# Patient Record
Sex: FEMALE | Race: WHITE | NOT HISPANIC OR LATINO | Employment: OTHER | URBAN - NONMETROPOLITAN AREA
[De-identification: names, ages, dates, MRNs, and addresses within clinical notes are randomized per-mention and may not be internally consistent; named-entity substitution may affect disease eponyms.]

---

## 2017-01-17 DIAGNOSIS — M54.17 LUMBOSACRAL NEURITIS: ICD-10-CM

## 2017-01-17 DIAGNOSIS — M25.50 MULTIPLE JOINT PAIN: ICD-10-CM

## 2017-01-17 DIAGNOSIS — F41.9 ANXIETY: ICD-10-CM

## 2017-01-17 RX ORDER — HYDROCODONE BITARTRATE AND ACETAMINOPHEN 10; 325 MG/1; MG/1
TABLET ORAL
Qty: 120 TABLET | Refills: 0 | Status: SHIPPED | OUTPATIENT
Start: 2017-01-17 | End: 2017-02-17 | Stop reason: SDUPTHER

## 2017-01-17 RX ORDER — CLONAZEPAM 1 MG/1
TABLET ORAL
Qty: 90 TABLET | Refills: 0 | Status: SHIPPED | OUTPATIENT
Start: 2017-01-17 | End: 2017-02-17 | Stop reason: SDUPTHER

## 2017-02-17 ENCOUNTER — OFFICE VISIT (OUTPATIENT)
Dept: FAMILY MEDICINE CLINIC | Facility: CLINIC | Age: 52
End: 2017-02-17

## 2017-02-17 VITALS
BODY MASS INDEX: 36.3 KG/M2 | OXYGEN SATURATION: 97 % | TEMPERATURE: 97.1 F | SYSTOLIC BLOOD PRESSURE: 118 MMHG | WEIGHT: 268 LBS | DIASTOLIC BLOOD PRESSURE: 86 MMHG | HEIGHT: 72 IN | HEART RATE: 92 BPM

## 2017-02-17 DIAGNOSIS — J40 BRONCHITIS: Primary | ICD-10-CM

## 2017-02-17 DIAGNOSIS — M54.17 LUMBOSACRAL NEURITIS: ICD-10-CM

## 2017-02-17 DIAGNOSIS — M25.50 MULTIPLE JOINT PAIN: ICD-10-CM

## 2017-02-17 DIAGNOSIS — G47.00 INSOMNIA, UNSPECIFIED TYPE: ICD-10-CM

## 2017-02-17 DIAGNOSIS — F41.9 ANXIETY: ICD-10-CM

## 2017-02-17 PROCEDURE — 99213 OFFICE O/P EST LOW 20 MIN: CPT | Performed by: NURSE PRACTITIONER

## 2017-02-17 RX ORDER — CLONAZEPAM 1 MG/1
TABLET ORAL
Qty: 90 TABLET | Refills: 0 | Status: SHIPPED | OUTPATIENT
Start: 2017-02-17 | End: 2017-04-04 | Stop reason: SDUPTHER

## 2017-02-17 RX ORDER — ZOLPIDEM TARTRATE 10 MG/1
10 TABLET ORAL NIGHTLY PRN
Qty: 30 TABLET | Refills: 2 | Status: SHIPPED | OUTPATIENT
Start: 2017-02-17 | End: 2017-03-23 | Stop reason: SDUPTHER

## 2017-02-17 RX ORDER — BENZONATATE 100 MG/1
100 CAPSULE ORAL 3 TIMES DAILY PRN
Qty: 21 CAPSULE | Refills: 0 | Status: SHIPPED | OUTPATIENT
Start: 2017-02-17 | End: 2017-07-31

## 2017-02-17 RX ORDER — ALBUTEROL SULFATE 2.5 MG/3ML
2.5 SOLUTION RESPIRATORY (INHALATION) EVERY 4 HOURS PRN
Qty: 25 VIAL | Refills: 3 | Status: SHIPPED | OUTPATIENT
Start: 2017-02-17 | End: 2017-07-13 | Stop reason: SDUPTHER

## 2017-02-17 RX ORDER — HYDROCODONE BITARTRATE AND ACETAMINOPHEN 10; 325 MG/1; MG/1
TABLET ORAL
Qty: 120 TABLET | Refills: 0 | Status: SHIPPED | OUTPATIENT
Start: 2017-02-17 | End: 2017-04-25 | Stop reason: SDUPTHER

## 2017-02-17 NOTE — PROGRESS NOTES
Subjective   Shania Sweeney is a 51 y.o. female who presents to the office for follow-up of back pain, insomnia and anxiety.  Will need refills of hydrocodone, Ambien and Klonopin for these conditions respectively.  Lipase refills for all 3 of these medications was on January 17.  Is also experiencing some cough, was seen at [facility] with the past week who gave her a steroid pack, Z-Matt and changed antihistamine from Claritin to Zyrtec.  States she does feel somewhat better but continues with a cough.  States she has a nebulizer machine at home.  PCP is Leonardo.    History of Present Illness     The following portions of the patient's history were reviewed and updated as appropriate: allergies, current medications, past family history, past medical history, past social history, past surgical history and problem list.    Review of Systems   Constitutional: Negative for chills, fatigue and fever.   HENT: Negative for congestion, sneezing, sore throat and trouble swallowing.    Eyes: Negative for visual disturbance.   Respiratory: Positive for cough and wheezing. Negative for chest tightness and shortness of breath.    Cardiovascular: Negative for chest pain, palpitations and leg swelling.   Gastrointestinal: Negative for abdominal pain, constipation, diarrhea, nausea and vomiting.   Genitourinary: Negative for dysuria, frequency and urgency.   Musculoskeletal: Negative for neck pain.   Skin: Negative for rash.   Neurological: Negative for dizziness, weakness and headaches.   Psychiatric/Behavioral:        In the past two weeks the pt has not felt down, depressed, hopeless or lost interest in doing things   All other systems reviewed and are negative.    Objective   Physical Exam   Constitutional: She is oriented to person, place, and time. She appears well-developed and well-nourished.   HENT:   Head: Normocephalic and atraumatic.   Right Ear: External ear normal.   Left Ear: External ear  normal.   Nose: Nose normal.   Mouth/Throat: Oropharynx is clear and moist.   Eyes: Conjunctivae and EOM are normal. Pupils are equal, round, and reactive to light. Right eye exhibits no discharge. Left eye exhibits no discharge. No scleral icterus.   Neck: Normal range of motion. Neck supple. No thyromegaly present.   Cardiovascular: Normal rate, regular rhythm, normal heart sounds and intact distal pulses.  Exam reveals no gallop and no friction rub.    No murmur heard.  Pulmonary/Chest: Effort normal. No respiratory distress. She has no wheezes. She has rhonchi (posterior) in the right upper field, the right middle field and the right lower field. She has no rales.   Abdominal: Soft. Bowel sounds are normal. She exhibits no distension. There is no tenderness. There is no rebound and no guarding.   Musculoskeletal: Normal range of motion. She exhibits no edema.        Lumbar back: She exhibits pain (chronic, rates pain a 6).   Lymphadenopathy:     She has no cervical adenopathy.   Neurological: She is alert and oriented to person, place, and time. No cranial nerve deficit. GCS eye subscore is 4. GCS verbal subscore is 5. GCS motor subscore is 6.   Skin: Skin is warm, dry and intact. No rash noted.   Psychiatric: She has a normal mood and affect. Her speech is normal and behavior is normal. Judgment and thought content normal. Her mood appears not anxious. Cognition and memory are normal. She does not exhibit a depressed mood. She expresses no homicidal and no suicidal ideation. She expresses no suicidal plans and no homicidal plans.   Dressed appropriately for weather and situation, makes eye contact and engages in conversation.  No outward signs of anxiety or depression   Nursing note and vitals reviewed.    Assessment/Plan   Shania was seen today for back pain, insomnia and anxiety.    Diagnoses and all orders for this visit:    Anxiety  -     clonazePAM (KlonoPIN) 1 MG tablet; Take one in the AM and 2 in the  evening prn anxiety.    Multiple joint pain  -     HYDROcodone-acetaminophen (NORCO)  MG per tablet; Take 1/2 to 1 tablet by mouth every 6 hours as needed for pain    Lumbosacral neuritis  -     HYDROcodone-acetaminophen (NORCO)  MG per tablet; Take 1/2 to 1 tablet by mouth every 6 hours as needed for pain    Insomnia, unspecified type  -     zolpidem (AMBIEN) 10 MG tablet; Take 1 tablet by mouth At Night As Needed for sleep.         Patient understands the risks associated with this controlled medication, including tolerance and addiction.  Patient also agrees to only obtain this medication from me, and not from a another provider, unless that provider is covering for me in my absence.  Patient also agrees to be compliant in dosing, and not self adjust the dose of medication.  A signed controlled substance agreement is on file, and the patient has received a controlled substance education sheet at this a previous visit.  The patient has also signed a consent for treatment with a controlled substance as per [facility] policy. JAYRO was obtained.

## 2017-02-17 NOTE — PATIENT INSTRUCTIONS
Encouraged meds as directed.  Should take one nebulizer treatment every 6 hours.  Good handwashing, no smoke exposure and fluids.

## 2017-03-06 RX ORDER — BACLOFEN 20 MG/1
TABLET ORAL
Qty: 120 TABLET | Refills: 0 | Status: SHIPPED | OUTPATIENT
Start: 2017-03-06 | End: 2017-07-26 | Stop reason: SDUPTHER

## 2017-03-14 RX ORDER — CETIRIZINE HYDROCHLORIDE 10 MG/1
10 TABLET ORAL DAILY
Qty: 30 TABLET | Refills: 2 | Status: SHIPPED | OUTPATIENT
Start: 2017-03-14 | End: 2017-04-06 | Stop reason: SDUPTHER

## 2017-03-23 DIAGNOSIS — G47.00 INSOMNIA, UNSPECIFIED TYPE: ICD-10-CM

## 2017-03-23 RX ORDER — ZOLPIDEM TARTRATE 10 MG/1
10 TABLET ORAL NIGHTLY PRN
Qty: 30 TABLET | Refills: 2 | Status: SHIPPED | OUTPATIENT
Start: 2017-03-23 | End: 2017-04-06 | Stop reason: SDUPTHER

## 2017-04-04 DIAGNOSIS — F41.9 ANXIETY: ICD-10-CM

## 2017-04-04 RX ORDER — CLONAZEPAM 1 MG/1
TABLET ORAL
Qty: 90 TABLET | Refills: 0 | Status: SHIPPED | OUTPATIENT
Start: 2017-04-04 | End: 2017-04-06 | Stop reason: SDUPTHER

## 2017-04-06 ENCOUNTER — OFFICE VISIT (OUTPATIENT)
Dept: FAMILY MEDICINE CLINIC | Facility: CLINIC | Age: 52
End: 2017-04-06

## 2017-04-06 VITALS
DIASTOLIC BLOOD PRESSURE: 78 MMHG | SYSTOLIC BLOOD PRESSURE: 104 MMHG | HEART RATE: 66 BPM | TEMPERATURE: 98.2 F | BODY MASS INDEX: 36.3 KG/M2 | WEIGHT: 268 LBS | OXYGEN SATURATION: 98 % | HEIGHT: 72 IN

## 2017-04-06 DIAGNOSIS — G47.00 INSOMNIA, UNSPECIFIED TYPE: ICD-10-CM

## 2017-04-06 DIAGNOSIS — R32 URINARY INCONTINENCE, UNSPECIFIED TYPE: Primary | ICD-10-CM

## 2017-04-06 DIAGNOSIS — J40 BRONCHITIS: ICD-10-CM

## 2017-04-06 DIAGNOSIS — M25.50 MULTIPLE JOINT PAIN: ICD-10-CM

## 2017-04-06 DIAGNOSIS — F41.9 ANXIETY: ICD-10-CM

## 2017-04-06 DIAGNOSIS — E66.9 OBESITY, UNSPECIFIED OBESITY SEVERITY, UNSPECIFIED OBESITY TYPE: ICD-10-CM

## 2017-04-06 PROCEDURE — 99214 OFFICE O/P EST MOD 30 MIN: CPT | Performed by: NURSE PRACTITIONER

## 2017-04-06 RX ORDER — GABAPENTIN 800 MG/1
800 TABLET ORAL 3 TIMES DAILY
Qty: 270 TABLET | Refills: 1 | Status: SHIPPED | OUTPATIENT
Start: 2017-04-06 | End: 2017-04-14 | Stop reason: SDUPTHER

## 2017-04-06 RX ORDER — AMITRIPTYLINE HYDROCHLORIDE 25 MG/1
25 TABLET, FILM COATED ORAL NIGHTLY
Qty: 30 TABLET | Refills: 1 | Status: SHIPPED | OUTPATIENT
Start: 2017-04-06 | End: 2017-04-14 | Stop reason: SDUPTHER

## 2017-04-06 RX ORDER — OXYBUTYNIN CHLORIDE 10 MG/1
10 TABLET, EXTENDED RELEASE ORAL DAILY
Qty: 30 TABLET | Refills: 1 | Status: SHIPPED | OUTPATIENT
Start: 2017-04-06 | End: 2017-04-14 | Stop reason: SDUPTHER

## 2017-04-06 RX ORDER — LIDOCAINE 50 MG/G
1 PATCH TOPICAL EVERY 24 HOURS
COMMUNITY
End: 2017-07-31

## 2017-04-06 RX ORDER — ZOLPIDEM TARTRATE 10 MG/1
10 TABLET ORAL NIGHTLY PRN
Qty: 30 TABLET | Refills: 2 | Status: SHIPPED | OUTPATIENT
Start: 2017-04-06 | End: 2017-06-19 | Stop reason: SDUPTHER

## 2017-04-06 RX ORDER — ESCITALOPRAM OXALATE 20 MG/1
20 TABLET ORAL DAILY
Qty: 90 TABLET | Refills: 3 | Status: SHIPPED | OUTPATIENT
Start: 2017-04-06 | End: 2017-04-14 | Stop reason: SDUPTHER

## 2017-04-06 RX ORDER — MONTELUKAST SODIUM 10 MG/1
TABLET ORAL
Qty: 90 TABLET | Refills: 1 | Status: SHIPPED | OUTPATIENT
Start: 2017-04-06 | End: 2017-04-14 | Stop reason: SDUPTHER

## 2017-04-06 RX ORDER — CETIRIZINE HYDROCHLORIDE 10 MG/1
10 TABLET ORAL DAILY
Qty: 90 TABLET | Refills: 3 | Status: SHIPPED | OUTPATIENT
Start: 2017-04-06 | End: 2017-08-07 | Stop reason: SDUPTHER

## 2017-04-06 RX ORDER — CLONAZEPAM 1 MG/1
TABLET ORAL
Qty: 90 TABLET | Refills: 0 | Status: SHIPPED | OUTPATIENT
Start: 2017-04-06 | End: 2017-05-05 | Stop reason: SDUPTHER

## 2017-04-06 RX ORDER — PHENTERMINE HYDROCHLORIDE 37.5 MG/1
CAPSULE ORAL
Qty: 15 CAPSULE | Refills: 1 | Status: CANCELLED | OUTPATIENT
Start: 2017-04-06
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
## 2017-04-14 DIAGNOSIS — R32 URINARY INCONTINENCE, UNSPECIFIED TYPE: ICD-10-CM

## 2017-04-14 DIAGNOSIS — M25.50 MULTIPLE JOINT PAIN: ICD-10-CM

## 2017-04-14 DIAGNOSIS — J40 BRONCHITIS: ICD-10-CM

## 2017-04-14 DIAGNOSIS — F41.9 ANXIETY: ICD-10-CM

## 2017-04-14 RX ORDER — MONTELUKAST SODIUM 10 MG/1
TABLET ORAL
Qty: 90 TABLET | Refills: 1 | Status: SHIPPED | OUTPATIENT
Start: 2017-04-14 | End: 2017-10-26 | Stop reason: SDUPTHER

## 2017-04-14 RX ORDER — OXYBUTYNIN CHLORIDE 10 MG/1
10 TABLET, EXTENDED RELEASE ORAL DAILY
Qty: 90 TABLET | Refills: 1 | Status: SHIPPED | OUTPATIENT
Start: 2017-04-14 | End: 2017-10-26 | Stop reason: SDUPTHER

## 2017-04-14 RX ORDER — GABAPENTIN 800 MG/1
800 TABLET ORAL 3 TIMES DAILY
Qty: 270 TABLET | Refills: 1 | Status: SHIPPED | OUTPATIENT
Start: 2017-04-14 | End: 2017-08-07

## 2017-04-14 RX ORDER — AMITRIPTYLINE HYDROCHLORIDE 25 MG/1
25 TABLET, FILM COATED ORAL NIGHTLY
Qty: 90 TABLET | Refills: 1 | Status: SHIPPED | OUTPATIENT
Start: 2017-04-14 | End: 2017-04-14 | Stop reason: SDUPTHER

## 2017-04-14 RX ORDER — ESCITALOPRAM OXALATE 20 MG/1
20 TABLET ORAL DAILY
Qty: 90 TABLET | Refills: 3 | Status: SHIPPED | OUTPATIENT
Start: 2017-04-14 | End: 2017-10-25 | Stop reason: ALTCHOICE

## 2017-04-14 RX ORDER — AMITRIPTYLINE HYDROCHLORIDE 25 MG/1
25 TABLET, FILM COATED ORAL NIGHTLY
Qty: 90 TABLET | Refills: 1 | Status: SHIPPED | OUTPATIENT
Start: 2017-04-14 | End: 2017-04-25

## 2017-04-14 RX ORDER — OXYBUTYNIN CHLORIDE 10 MG/1
10 TABLET, EXTENDED RELEASE ORAL DAILY
Qty: 90 TABLET | Refills: 1 | Status: SHIPPED | OUTPATIENT
Start: 2017-04-14 | End: 2017-04-14 | Stop reason: SDUPTHER

## 2017-04-14 NOTE — TELEPHONE ENCOUNTER
Patient called stating she needed medication sent to [facility] instead of [facility]. Medication that was sent on 04/06/2017 to [facility] was reordered and sent to [facility]

## 2017-04-25 ENCOUNTER — OFFICE VISIT (OUTPATIENT)
Dept: FAMILY MEDICINE CLINIC | Facility: CLINIC | Age: 52
End: 2017-04-25

## 2017-04-25 VITALS — DIASTOLIC BLOOD PRESSURE: 64 MMHG | HEART RATE: 77 BPM | HEIGHT: 72 IN | SYSTOLIC BLOOD PRESSURE: 118 MMHG

## 2017-04-25 DIAGNOSIS — F32.9 REACTIVE DEPRESSION: ICD-10-CM

## 2017-04-25 DIAGNOSIS — M54.17 LUMBOSACRAL NEURITIS: ICD-10-CM

## 2017-04-25 DIAGNOSIS — R32 URINARY INCONTINENCE, UNSPECIFIED TYPE: Primary | ICD-10-CM

## 2017-04-25 DIAGNOSIS — M25.50 MULTIPLE JOINT PAIN: ICD-10-CM

## 2017-04-25 PROCEDURE — 99214 OFFICE O/P EST MOD 30 MIN: CPT | Performed by: NURSE PRACTITIONER

## 2017-04-25 RX ORDER — AMITRIPTYLINE HYDROCHLORIDE 50 MG/1
50 TABLET, FILM COATED ORAL NIGHTLY
Qty: 30 TABLET | Refills: 5 | Status: SHIPPED | OUTPATIENT
Start: 2017-04-25 | End: 2017-07-13

## 2017-04-25 RX ORDER — HYDROCODONE BITARTRATE AND ACETAMINOPHEN 10; 325 MG/1; MG/1
TABLET ORAL
Qty: 120 TABLET | Refills: 0 | Status: SHIPPED | OUTPATIENT
Start: 2017-04-25 | End: 2017-04-25 | Stop reason: SDUPTHER

## 2017-04-25 RX ORDER — HYDROCODONE BITARTRATE AND ACETAMINOPHEN 10; 325 MG/1; MG/1
TABLET ORAL
Qty: 120 TABLET | Refills: 0 | Status: SHIPPED | OUTPATIENT
Start: 2017-04-25 | End: 2017-07-13 | Stop reason: SDUPTHER

## 2017-04-25 NOTE — PROGRESS NOTES
Subjective   Shania Sweeney is a 52 y.o. female.     Chief Complaint   Patient presents with   • Urinary Incontinence     at night only since January when she fell       Anxiety   Symptoms include dizziness, insomnia and nervous/anxious behavior. Patient reports no chest pain, confusion, nausea, palpitations, shortness of breath or suicidal ideas.       Insomnia   Associated symptoms include arthralgias, coughing, headaches, neck pain and numbness. Pertinent negatives include no abdominal pain, chest pain, chills, congestion, fatigue, fever, myalgias, nausea, rash, sore throat, vomiting or weakness.        Here to f/u on chronic neck and low back pain.  Her urinary incontinence has not improved with Ditropan and Elavil. This started after her [place] the mall around 4-5 months ago. She states she has to recline back a little when sitting up on the toilet in order to empty her bladder.      She does have a new disk herniation in her lumbar spine. Dr. Cannon has ordered ESIS and doesn't currently recommend surgery. She will f/u with him after her 3rd injection.      She is having depression since her setback. She is unable to be as active and is gaining weight.         The following portions of the patient's history were reviewed and updated as appropriate: allergies, past family history, past medical history, past social history, past surgical history and problem list.    Review of Systems   Constitutional: Negative for activity change, chills, fatigue and fever.   HENT: Negative for congestion, rhinorrhea, sinus pressure and sore throat.    Eyes: Positive for visual disturbance. Negative for pain, discharge and itching.   Respiratory: Positive for cough. Negative for shortness of breath and wheezing.    Cardiovascular: Negative for chest pain, palpitations and leg swelling.   Gastrointestinal: Negative for abdominal pain, blood in stool, constipation, diarrhea, nausea and vomiting.   Endocrine: Negative for  "polydipsia and polyuria.   Genitourinary: Negative for dysuria, flank pain, frequency, hematuria and urgency.   Musculoskeletal: Positive for arthralgias, back pain, neck pain and neck stiffness. Negative for gait problem and myalgias.   Skin: Negative for rash.   Neurological: Positive for dizziness, speech difficulty, numbness and headaches. Negative for tremors, seizures, syncope and weakness.        Burning pain in both feet  \"firecraker\" pain in the right hand.   Hematological: Negative for adenopathy. Does not bruise/bleed easily.   Psychiatric/Behavioral: Positive for sleep disturbance. Negative for confusion, dysphoric mood and suicidal ideas. The patient is nervous/anxious and has insomnia.        Objective   Physical Exam   Constitutional: She is oriented to person, place, and time. She appears well-developed and well-nourished. No distress.   Eyes: Conjunctivae and EOM are normal. Pupils are equal, round, and reactive to light. Right eye exhibits no discharge. Left eye exhibits no discharge. No scleral icterus.   Neck: Neck supple. No thyromegaly present.   Cardiovascular: Normal rate, regular rhythm and normal heart sounds.    No murmur heard.  Pulmonary/Chest: Effort normal and breath sounds normal. No respiratory distress. She has no wheezes. She has no rales.   Musculoskeletal: She exhibits tenderness. She exhibits no edema or deformity.   Cervical pv muscle tenderness       Lymphadenopathy:     She has no cervical adenopathy.   Neurological: She is alert and oriented to person, place, and time. No cranial nerve deficit. Coordination normal.   Slow speech with some scanning noted at times.   Impaired heel/toe walking  Strength to extremities intact.  Light touch and 2 point discrimination to right hand and feet impaired.      Skin: Skin is warm and dry. No rash noted. No pallor.   Right great toenail with the distal section missing. The proximal section is intact but dark in color.    Psychiatric: She " has a normal mood and affect. Her behavior is normal.   Nursing note and vitals reviewed.      Assessment/Plan   Shania was seen today for urinary incontinence.    Diagnoses and all orders for this visit:    Urinary incontinence, unspecified type  -     amitriptyline (ELAVIL) 50 MG tablet; Take 1 tablet by mouth Every Night.  -     tamsulosin (FLOMAX) 0.4 MG capsule 24 hr capsule; Take 1 capsule by mouth Every Night.    Multiple joint pain  -     Discontinue: HYDROcodone-acetaminophen (NORCO)  MG per tablet; Take 1/2 to 1 tablet by mouth every 6 hours as needed for pain  -     amitriptyline (ELAVIL) 50 MG tablet; Take 1 tablet by mouth Every Night.  -     HYDROcodone-acetaminophen (NORCO)  MG per tablet; Take 1/2 to 1 tablet by mouth every 6 hours as needed for pain    Lumbosacral neuritis  -     Discontinue: HYDROcodone-acetaminophen (NORCO)  MG per tablet; Take 1/2 to 1 tablet by mouth every 6 hours as needed for pain  -     amitriptyline (ELAVIL) 50 MG tablet; Take 1 tablet by mouth Every Night.  -     HYDROcodone-acetaminophen (NORCO)  MG per tablet; Take 1/2 to 1 tablet by mouth every 6 hours as needed for pain    Reactive depression    Stop Ditropan. She may be having overflow incontinence due to retention. Will try her on Flomax and monitor for rash due to possible cross sensitivity due to sulfa rash.   Refer to Urology. Will continue Elavil due to the pain benefit.     She will f/u with Dr. Cannon after her 3 epidurals.     Continue other medications. Reviewed potential medication side effects and benefits. Instructed to report side effects. Report if symptoms worsen or persist. Understanding expressed.    Counseling for depression encouraged and she will consider.     F/U  With me in 3 mos and prn.

## 2017-04-29 RX ORDER — TAMSULOSIN HYDROCHLORIDE 0.4 MG/1
1 CAPSULE ORAL NIGHTLY
Qty: 30 CAPSULE | Refills: 5 | Status: SHIPPED | OUTPATIENT
Start: 2017-04-29 | End: 2017-07-31 | Stop reason: ALTCHOICE

## 2017-05-01 DIAGNOSIS — R32 URINARY INCONTINENCE, UNSPECIFIED TYPE: Primary | ICD-10-CM

## 2017-05-02 ENCOUNTER — TRANSCRIBE ORDERS (OUTPATIENT)
Dept: PHYSICAL THERAPY | Facility: HOSPITAL | Age: 52
End: 2017-05-02

## 2017-05-02 DIAGNOSIS — S92.355D CLOSED NONDISPLACED FRACTURE OF FIFTH METATARSAL BONE OF LEFT FOOT WITH ROUTINE HEALING, SUBSEQUENT ENCOUNTER: Primary | ICD-10-CM

## 2017-05-05 DIAGNOSIS — F41.9 ANXIETY: ICD-10-CM

## 2017-05-09 RX ORDER — CLONAZEPAM 1 MG/1
TABLET ORAL
Qty: 90 TABLET | Refills: 0 | Status: SHIPPED | OUTPATIENT
Start: 2017-05-09 | End: 2017-06-13 | Stop reason: SDUPTHER

## 2017-05-11 ENCOUNTER — HOSPITAL ENCOUNTER (OUTPATIENT)
Dept: PHYSICAL THERAPY | Facility: HOSPITAL | Age: 52
Setting detail: THERAPIES SERIES
Discharge: HOME | End: 2017-05-11

## 2017-05-11 DIAGNOSIS — S92.355D CLOSED NONDISPLACED FRACTURE OF FIFTH METATARSAL BONE OF LEFT FOOT WITH ROUTINE HEALING, SUBSEQUENT ENCOUNTER: Primary | ICD-10-CM

## 2017-05-11 PROCEDURE — 97161 PT EVAL LOW COMPLEX 20 MIN: CPT

## 2017-05-11 PROCEDURE — G0283 ELEC STIM OTHER THAN WOUND: HCPCS

## 2017-05-11 PROCEDURE — 97110 THERAPEUTIC EXERCISES: CPT

## 2017-05-15 ENCOUNTER — HOSPITAL ENCOUNTER (OUTPATIENT)
Dept: PHYSICAL THERAPY | Facility: HOSPITAL | Age: 52
Setting detail: THERAPIES SERIES
Discharge: HOME | End: 2017-05-15

## 2017-05-15 DIAGNOSIS — S92.355D CLOSED NONDISPLACED FRACTURE OF FIFTH METATARSAL BONE OF LEFT FOOT WITH ROUTINE HEALING, SUBSEQUENT ENCOUNTER: Primary | ICD-10-CM

## 2017-05-15 PROCEDURE — 97140 MANUAL THERAPY 1/> REGIONS: CPT

## 2017-05-15 PROCEDURE — G0283 ELEC STIM OTHER THAN WOUND: HCPCS

## 2017-05-15 PROCEDURE — 97110 THERAPEUTIC EXERCISES: CPT

## 2017-05-16 ENCOUNTER — HOSPITAL ENCOUNTER (OUTPATIENT)
Dept: PHYSICAL THERAPY | Facility: HOSPITAL | Age: 52
Setting detail: THERAPIES SERIES
Discharge: HOME | End: 2017-05-16

## 2017-05-16 DIAGNOSIS — S92.355D CLOSED NONDISPLACED FRACTURE OF FIFTH METATARSAL BONE OF LEFT FOOT WITH ROUTINE HEALING, SUBSEQUENT ENCOUNTER: Primary | ICD-10-CM

## 2017-05-16 PROCEDURE — 97140 MANUAL THERAPY 1/> REGIONS: CPT

## 2017-05-16 PROCEDURE — 97110 THERAPEUTIC EXERCISES: CPT

## 2017-05-18 ENCOUNTER — HOSPITAL ENCOUNTER (OUTPATIENT)
Dept: PHYSICAL THERAPY | Facility: HOSPITAL | Age: 52
Setting detail: THERAPIES SERIES
Discharge: HOME | End: 2017-05-18

## 2017-05-18 DIAGNOSIS — S92.355D CLOSED NONDISPLACED FRACTURE OF FIFTH METATARSAL BONE OF LEFT FOOT WITH ROUTINE HEALING, SUBSEQUENT ENCOUNTER: Primary | ICD-10-CM

## 2017-05-18 PROCEDURE — 97140 MANUAL THERAPY 1/> REGIONS: CPT

## 2017-05-18 PROCEDURE — 97110 THERAPEUTIC EXERCISES: CPT

## 2017-05-23 ENCOUNTER — HOSPITAL ENCOUNTER (OUTPATIENT)
Dept: PHYSICAL THERAPY | Facility: HOSPITAL | Age: 52
Setting detail: THERAPIES SERIES
End: 2017-05-23

## 2017-05-25 ENCOUNTER — HOSPITAL ENCOUNTER (OUTPATIENT)
Dept: PHYSICAL THERAPY | Facility: HOSPITAL | Age: 52
Setting detail: THERAPIES SERIES
Discharge: HOME | End: 2017-05-25

## 2017-05-25 DIAGNOSIS — S92.355D CLOSED NONDISPLACED FRACTURE OF FIFTH METATARSAL BONE OF LEFT FOOT WITH ROUTINE HEALING, SUBSEQUENT ENCOUNTER: Primary | ICD-10-CM

## 2017-05-25 PROCEDURE — 97140 MANUAL THERAPY 1/> REGIONS: CPT

## 2017-05-25 PROCEDURE — 97110 THERAPEUTIC EXERCISES: CPT

## 2017-05-30 ENCOUNTER — HOSPITAL ENCOUNTER (OUTPATIENT)
Dept: PHYSICAL THERAPY | Facility: HOSPITAL | Age: 52
Setting detail: THERAPIES SERIES
Discharge: HOME | End: 2017-05-30

## 2017-05-30 DIAGNOSIS — S92.355D CLOSED NONDISPLACED FRACTURE OF FIFTH METATARSAL BONE OF LEFT FOOT WITH ROUTINE HEALING, SUBSEQUENT ENCOUNTER: Primary | ICD-10-CM

## 2017-05-30 PROCEDURE — G0283 ELEC STIM OTHER THAN WOUND: HCPCS

## 2017-05-30 PROCEDURE — 97110 THERAPEUTIC EXERCISES: CPT

## 2017-06-01 ENCOUNTER — HOSPITAL ENCOUNTER (OUTPATIENT)
Dept: PHYSICAL THERAPY | Facility: HOSPITAL | Age: 52
Setting detail: THERAPIES SERIES
Discharge: HOME | End: 2017-06-01

## 2017-06-01 DIAGNOSIS — S92.355D CLOSED NONDISPLACED FRACTURE OF FIFTH METATARSAL BONE OF LEFT FOOT WITH ROUTINE HEALING, SUBSEQUENT ENCOUNTER: Primary | ICD-10-CM

## 2017-06-01 PROCEDURE — G0283 ELEC STIM OTHER THAN WOUND: HCPCS

## 2017-06-01 PROCEDURE — 97110 THERAPEUTIC EXERCISES: CPT

## 2017-06-05 ENCOUNTER — HOSPITAL ENCOUNTER (OUTPATIENT)
Dept: PHYSICAL THERAPY | Facility: HOSPITAL | Age: 52
Setting detail: THERAPIES SERIES
Discharge: HOME | End: 2017-06-05

## 2017-06-05 DIAGNOSIS — S92.355D CLOSED NONDISPLACED FRACTURE OF FIFTH METATARSAL BONE OF LEFT FOOT WITH ROUTINE HEALING, SUBSEQUENT ENCOUNTER: Primary | ICD-10-CM

## 2017-06-05 PROCEDURE — G0283 ELEC STIM OTHER THAN WOUND: HCPCS

## 2017-06-05 PROCEDURE — 97035 APP MDLTY 1+ULTRASOUND EA 15: CPT

## 2017-06-05 PROCEDURE — 97110 THERAPEUTIC EXERCISES: CPT

## 2017-06-05 NOTE — PROGRESS NOTES
Outpatient Physical Therapy Ortho Treatment Note   Oli Yasir     Patient Name: Shania Sweeney  : 1965  MRN: [id number]  Today's Date: 2017      Visit Date: 2017    Subjective Improvement:     25%  Attendance: 8/10  Approved:   Per Medical necessity        MD follow up:            date:     17    Visit Dx:    ICD-10-CM ICD-9-CM   1. 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   • Anxiety   • Neck pain   • Brachial neuritis   • Bronchitis        Past Medical History:   Diagnosis Date   • Acute bronchitis     Improving   • Anxiety    • 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    • Chronic back pain     C spine surgery   • Chronic headache disorder    • Cough    • Degenerative joint disease involving multiple joints    • Rossana-Danlos syndrome    • Hip pain    • Hyperlipidemia     Unspecified   • Insomnia    • Lumbosacral neuritis    • Multiple joint pain    • Neck pain    • Need for prophylactic vaccination and inoculation against influenza    • Obesity    • Pain in joint involving shoulder region     Left   • Rash and nonspecific skin eruption     Rash and other nonspecific skin eruption      • Spasm of back muscles    • Spasm of muscle         Past Surgical History:   Procedure Laterality Date   • APPENDECTOMY     • CERVICAL SPINE SURGERY N/A    • CHOLECYSTECTOMY     • FACIAL RECONSTRUCTION SURGERY N/A    • HYSTERECTOMY     • INJECTION OF MEDICATION  2015    Depo Medrol (Methylprednisone) 80mg    • INJECTION OF MEDICATION  2015     Toradol   • LUMBAR SPINE SURGERY N/A    • PROCEDURE GENERIC CONVERTED  05/24/2013    Nebulizer Treatment   • RECONSTRUCTION OF NOSE N/A    • SHOULDER ROTATOR CUFF REPAIR Right     x 2   • TONSILLECTOMY     • TRIGGER POINT INJECTION  03/02/2016    [facility] Pt Inj, sing/multi pts. 3/+ muscles              PT Ortho       06/05/17 1400    Subjective Comments    Subjective Comments Reports having bruising from STM last visit.  -TM    Precautions and Contraindications    Precautions/Limitations no known precautions/limitations  -TM    Subjective Pain    Able to rate subjective pain? yes  -TM    Left Ankle    Dorsiflexion AROM Deficit 2°  -TM    Plantarflexion AROM Deficit 44°  -TM    Inversion AROM Deficit 32°  -TM    Eversion AROM Deficit 14°  -TM      User Key  (r) = Recorded By, (t) = Taken By, (c) = Cosigned By    Initials Name Provider Type    TM Ivanna Sams PTA Physical Therapy Assistant                            PT Assessment/Plan       06/05/17 1400       PT Assessment    Functional Limitations Decreased safety during functional activities;Impaired gait;Limitation in home management;Limitations in functional capacity and performance;Performance in leisure activities;Performance in self-care ADL;Impaired locomotion;Performance in sport activities  -TM     Impairments Balance;Coordination;Endurance;Gait;Impaired aerobic capacity;Impaired muscle endurance;Impaired muscle power;Joint integrity;Joint mobility;Locomotion;Motor function;Muscle strength;Pain;Poor body mechanics;Posture;Range of motion  -TM     Assessment Comments Added US RX today, due to limited progress & continued TTP & pain.  -TM     Rehab Potential Excellent  -TM     Patient/caregiver participated in establishment of treatment plan and goals Yes  -TM     Patient would benefit from skilled therapy intervention Yes  -TM     PT Plan    PT Frequency 3x/week  -TM     Predicted Duration of Therapy Intervention (days/wks) 4-6 weeks  -TM     PT Plan  "Comments PT recheck next visit.  -TM       User Key  (r) = Recorded By, (t) = Taken By, (c) = Cosigned By    Initials Name Provider Type    TM Ivanna Sams PTA Physical Therapy Assistant                Modalities       06/05/17 1400          Ice    Ice Applied Yes  -TM      Location L foot/ankle  -TM      Ice S/P Rx Yes  -TM      ELECTRICAL STIMULATION    Attended/Unattended Unattended  -TM      Stimulation Type IFC  -TM      Location/Electrode Placement/Other Left ankle w/ Ice  -TM      Rx Minutes 20 mins  -TM      Ultrasound [id number]    Location L lateral foot/ankle  -TM      Rx Minutes 8  -TM      Duty Cycle 100  -TM      Frequency 3.0 MHz  -TM      Intensity - Wts/cm 1.5  -TM        User Key  (r) = Recorded By, (t) = Taken By, (c) = Cosigned By    Initials Name Provider Type    TM Ivanna Sams PTA Physical Therapy Assistant                Exercises       06/05/17 1400          Subjective Comments    Subjective Comments Reports having bruising from STM last visit.  -TM      Subjective Pain    Able to rate subjective pain? yes  -TM      Exercise 1    Exercise Name 1 HC stretch with strap  -TM      Reps 1 3  -TM      Time (Seconds) 1 30\"  -TM      Exercise 2    Exercise Name 2 PRO II for ROM/endurance  -TM      Resistance 2 --   3.0  -TM      Time (Minutes) 2 8  -TM      Exercise 3    Exercise Name 3 standing wobbleboard CW/CCW  -TM      Sets 3 1  -TM      Reps 3 20   each  -TM      Exercise 4    Exercise Name 4 step up and over- 4 inch  -TM      Sets 4 2  -TM      Reps 4 10  -TM      Exercise 5    Exercise Name 5 TBand 4 way   -TM      Equipment 5 Theraband  -TM      Resistance 5 Red  -TM      Sets 5 2  -TM      Reps 5 10  -TM      Exercise 6    Exercise Name 6 SLS L LE  -TM      Reps 6 --   multiple attempts  -TM      Time (Seconds) 6 3-4 sec  -TM        User Key  (r) = Recorded By, (t) = Taken By, (c) = Cosigned By    Initials Name Provider Type    TM Ivanna Sams PTA Physical Therapy Assistant "                               PT OP Goals       06/05/17 1400       PT Short Term Goals    STG Date to Achieve 06/01/17  -TM     STG 1 Tolerate a 45 minute treatment session with no increase in pain.  -TM     STG 1 Progress Progressing  -TM     STG 2 L ankle DF to 5 or greater.  -TM     STG 2 Progress Progressing  -TM     STG 3 Increase L eversion strength to 4-/5 or greater.  -TM     STG 3 Progress Ongoing  -TM     STG 4 L SLS EO 30 seconds or greater.  -TM     STG 4 Progress Ongoing  -TM     Long Term Goals    LTG Date to Achieve 06/01/17  -TM     LTG 1 60% improvement or greater.  -TM     LTG 1 Progress Progressing  -TM     LTG 2 Ambulate with non-antalgic gait.  -TM     LTG 2 Progress Progressing  -TM     LTG 3 Decrease use of ankle brace to 3 hours per day or less.  -TM     LTG 3 Progress Ongoing  -TM     LTG 4 LEFS to 16 or less.  -TM     LTG 4 Progress Ongoing  -TM     LTG 5 L SLS EO 1 minute or greater.  -TM     LTG 5 Progress Progressing  -TM     LTG 6 L ankle DF to 12 or greater.  -TM     LTG 6 Progress Progressing  -TM     LTG 7 L ankle PF to 50 or greater.  -TM     LTG 7 Progress Progressing  -TM     LTG 8 Increase L eversion strength to 4+/5 or greater.  -TM     LTG 8 Progress Progressing  -TM       User Key  (r) = Recorded By, (t) = Taken By, (c) = Cosigned By    Initials Name Provider Type    TM Ivanna Sams PTA Physical Therapy Assistant                    Time Calculation:   Start Time: 1345  Stop Time: 1456  Time Calculation (min): 71 min  Total Timed Code Minutes- PT: 51 minute(s)    Therapy Charges for Today     Code Description Service Date Service Provider Modifiers Qty    76939300112 HC PT ULTRASOUND EA 15 MIN 6/5/2017 Ivanna Smas, PTA GP 1    07668431889 HC PT THER PROC EA 15 MIN 6/5/2017 Ivanna Sams, PTA GP 2    65157142426 HC PT ELECTRICAL STIM UNATTENDED 6/5/2017 Ivanna Sams, IVETT  1                    Ivanna Sams, IVETT  6/5/2017

## 2017-06-06 ENCOUNTER — HOSPITAL ENCOUNTER (OUTPATIENT)
Dept: PHYSICAL THERAPY | Facility: HOSPITAL | Age: 52
Setting detail: THERAPIES SERIES
Discharge: HOME | End: 2017-06-06

## 2017-06-06 DIAGNOSIS — S92.355D CLOSED NONDISPLACED FRACTURE OF FIFTH METATARSAL BONE OF LEFT FOOT WITH ROUTINE HEALING, SUBSEQUENT ENCOUNTER: Primary | ICD-10-CM

## 2017-06-06 PROCEDURE — 97140 MANUAL THERAPY 1/> REGIONS: CPT

## 2017-06-06 PROCEDURE — 97035 APP MDLTY 1+ULTRASOUND EA 15: CPT

## 2017-06-06 PROCEDURE — 97110 THERAPEUTIC EXERCISES: CPT

## 2017-06-06 PROCEDURE — G0283 ELEC STIM OTHER THAN WOUND: HCPCS

## 2017-06-06 NOTE — PROGRESS NOTES
Outpatient Physical Therapy Ortho Treatment Note   Oli Yasir     Patient Name: Shania Sweeney  : 1965  MRN: [id number]  Today's Date: 2017      Visit Date: 2017    Subjective Improvement:    25%   Attendance:   Approved:  Per medical necessity         MD follow up:            date: 17      Visit Dx:    ICD-10-CM ICD-9-CM   1. 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   • Anxiety   • Neck pain   • Brachial neuritis   • Bronchitis        Past Medical History:   Diagnosis Date   • Acute bronchitis     Improving   • Anxiety    • 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    • Chronic back pain     C spine surgery   • Chronic headache disorder    • Cough    • Degenerative joint disease involving multiple joints    • Rossana-Danlos syndrome    • Hip pain    • Hyperlipidemia     Unspecified   • Insomnia    • Lumbosacral neuritis    • Multiple joint pain    • Neck pain    • Need for prophylactic vaccination and inoculation against influenza    • Obesity    • Pain in joint involving shoulder region     Left   • Rash and nonspecific skin eruption     Rash and other nonspecific skin eruption      • Spasm of back muscles    • Spasm of muscle         Past Surgical History:   Procedure Laterality Date   • APPENDECTOMY     • CERVICAL SPINE SURGERY N/A    • CHOLECYSTECTOMY     • FACIAL RECONSTRUCTION SURGERY N/A    • HYSTERECTOMY     • INJECTION OF MEDICATION  2015    Depo Medrol (Methylprednisone) 80mg    • INJECTION OF MEDICATION  2015    Toradol    • LUMBAR SPINE SURGERY N/A    • PROCEDURE GENERIC CONVERTED  05/24/2013    Nebulizer Treatment   • RECONSTRUCTION OF NOSE N/A    • SHOULDER ROTATOR CUFF REPAIR Right     x 2   • TONSILLECTOMY     • TRIGGER POINT INJECTION  03/02/2016    [facility] Pt Inj, sing/multi pts. 3/+ muscles              PT Ortho       06/06/17 1300    Precautions and Contraindications    Precautions/Limitations no known precautions/limitations  -TM    Subjective Pain    Able to rate subjective pain? yes  -TM    Pre-Treatment Pain Level 3  -TM    Post-Treatment Pain Level 0  -TM    Posture/Observations    Posture/Observations Comments gait mildly antalgic with SC & ASO  -TM      06/05/17 1400    Subjective Comments    Subjective Comments Reports having bruising from STM last visit.  -TM    Precautions and Contraindications    Precautions/Limitations no known precautions/limitations  -TM    Subjective Pain    Able to rate subjective pain? yes  -TM    Pre-Treatment Pain Level 3  -TM    Post-Treatment Pain Level 2  -TM    Left Ankle    Dorsiflexion AROM Deficit 2°  -TM    Plantarflexion AROM Deficit 44°  -TM    Inversion AROM Deficit 32°  -TM    Eversion AROM Deficit 14°  -TM      User Key  (r) = Recorded By, (t) = Taken By, (c) = Cosigned By    Initials Name Provider Type    TM Ivanna Sams, PTA Physical Therapy Assistant                            PT Assessment/Plan       06/06/17 1500       PT Assessment    Functional Limitations Decreased safety during functional activities;Impaired gait;Limitation in home management;Limitations in functional capacity and performance;Performance in leisure activities;Performance in self-care ADL;Impaired locomotion;Performance in sport activities  -     Impairments Balance;Coordination;Endurance;Gait;Impaired aerobic capacity;Impaired muscle endurance;Impaired muscle power;Joint integrity;Joint mobility;Locomotion;Motor function;Muscle strength;Pain;Poor body mechanics;Posture;Range of motion  -TM      "Assessment Comments Pt conts with tenderness about the lateral foot & malleolus.  -TM     Rehab Potential Excellent  -TM     Patient/caregiver participated in establishment of treatment plan and goals Yes  -TM     Patient would benefit from skilled therapy intervention Yes  -TM     PT Plan    PT Frequency 3x/week  -TM     Predicted Duration of Therapy Intervention (days/wks) 4-6 weeks  -TM     PT Plan Comments PT recheck next visit.  -TM       User Key  (r) = Recorded By, (t) = Taken By, (c) = Cosigned By    Initials Name Provider Type     Ivanna Sams PTA Physical Therapy Assistant                Modalities       06/06/17 1300          Ice    Ice Applied Yes  -TM      Location L foot/ankle  -TM      Ice S/P Rx Yes  -TM      ELECTRICAL STIMULATION    Attended/Unattended Unattended  -TM      Stimulation Type IFC  -TM      Location/Electrode Placement/Other Left ankle w/ Ice  -TM      Rx Minutes 20 mins  -TM      Ultrasound [id number]    Location L lateral foot/ankle  -TM      Rx Minutes 8  -TM      Duty Cycle 100  -TM      Frequency 3.0 MHz  -TM      Intensity - Wts/cm 1.5  -TM        User Key  (r) = Recorded By, (t) = Taken By, (c) = Cosigned By    Initials Name Provider Type     Ivanna Sams PTA Physical Therapy Assistant                Exercises       06/06/17 1300          Subjective Comments    Subjective Comments Has been to doctor appt today & is tired.    -TM      Subjective Pain    Able to rate subjective pain? yes  -TM      Pre-Treatment Pain Level 3  -TM      Post-Treatment Pain Level 0  -TM      Exercise 1    Exercise Name 1 HC stretch with strap  -TM      Reps 1 3  -TM      Time (Seconds) 1 30\"  -TM      Exercise 2    Exercise Name 2 PRO II for ROM  -TM      Resistance 2 --   2.0  -TM      Time (Minutes) 2 8  -TM      Exercise 3    Exercise Name 3 TBand 4 way ankle  -TM      Equipment 3 Theraband  -TM      Resistance 3 Red  -TM      Sets 3 2  -TM      Reps 3 15  -TM      Exercise 4    " "Exercise Name 4 step up and over fwd 4\" inch  -TM      Sets 4 2  -TM      Reps 4 10  -TM      Exercise 5    Exercise Name 5 lateral step ups- 4 inch  -TM      Sets 5 2  -TM      Reps 5 10  -TM        User Key  (r) = Recorded By, (t) = Taken By, (c) = Cosigned By    Initials Name Provider Type     Ivanna SamsIVETT Physical Therapy Assistant                        Manual Rx (last 36 hours)      Manual Treatments       06/06/17 1300          Manual Rx 1    Manual Rx 1 Location L ankle   -TM      Manual Rx 1 Type posterior glide/MT glides/ manual stretch  -TM      Manual Rx 1 Duration 5'  -TM      Manual Rx 2    Manual Rx 2 Location manual HC stretch  -TM      Manual Rx 2 Duration 3 min  -TM        User Key  (r) = Recorded By, (t) = Taken By, (c) = Cosigned By    Initials Name Provider Type    TM Ivanna Sams, IVETT Physical Therapy Assistant                PT OP Goals       06/06/17 1500       PT Short Term Goals    STG Date to Achieve 06/01/17  -TM     STG 1 Tolerate a 45 minute treatment session with no increase in pain.  -TM     STG 1 Progress Progressing  -TM     STG 2 L ankle DF to 5 or greater.  -TM     STG 2 Progress Progressing  -TM     STG 3 Increase L eversion strength to 4-/5 or greater.  -TM     STG 3 Progress Ongoing  -TM     STG 4 L SLS EO 30 seconds or greater.  -TM     STG 4 Progress Ongoing  -TM     Long Term Goals    LTG Date to Achieve 06/01/17  -TM     LTG 1 60% improvement or greater.  -TM     LTG 1 Progress Progressing  -TM     LTG 2 Ambulate with non-antalgic gait.  -TM     LTG 2 Progress Progressing  -TM     LTG 3 Decrease use of ankle brace to 3 hours per day or less.  -TM     LTG 3 Progress Ongoing  -TM     LTG 4 LEFS to 16 or less.  -TM     LTG 4 Progress Ongoing  -TM     LTG 5 L SLS EO 1 minute or greater.  -TM     LTG 5 Progress Progressing  -TM     LTG 6 L ankle DF to 12 or greater.  -TM     LTG 6 Progress Progressing  -TM     LTG 7 L ankle PF to 50 or greater.  -TM     LTG 7 " Progress Progressing  -TM     LTG 8 Increase L eversion strength to 4+/5 or greater.  -TM     LTG 8 Progress Progressing  -TM       User Key  (r) = Recorded By, (t) = Taken By, (c) = Cosigned By    Initials Name Provider Type    TM Ivanna Sams PTA Physical Therapy Assistant                    Time Calculation:   Start Time: 1350  Stop Time: 1500  Time Calculation (min): 70 min  Total Timed Code Minutes- PT: 50 minute(s)    Therapy Charges for Today     Code Description Service Date Service Provider Modifiers Qty    16834606897 HC PT ELECTRICAL STIM UNATTENDED 6/6/2017 Ivanna Sams, PTA  1    90135571344 HC PT MANUAL THERAPY EA 15 MIN 6/6/2017 Ivanna Sams, PTA GP 1    31437083031 HC PT ULTRASOUND EA 15 MIN 6/6/2017 Ivanna Sams, PTA GP 1    56534197984 HC PT THER PROC EA 15 MIN 6/6/2017 Ivanna Sams, PTA GP 1                    Ivanna Sams, IVETT  6/6/2017

## 2017-06-08 ENCOUNTER — HOSPITAL ENCOUNTER (OUTPATIENT)
Dept: PHYSICAL THERAPY | Facility: HOSPITAL | Age: 52
Setting detail: THERAPIES SERIES
Discharge: HOME | End: 2017-06-08

## 2017-06-08 DIAGNOSIS — S92.355D CLOSED NONDISPLACED FRACTURE OF FIFTH METATARSAL BONE OF LEFT FOOT WITH ROUTINE HEALING, SUBSEQUENT ENCOUNTER: Primary | ICD-10-CM

## 2017-06-08 PROCEDURE — 97035 APP MDLTY 1+ULTRASOUND EA 15: CPT

## 2017-06-08 PROCEDURE — 97110 THERAPEUTIC EXERCISES: CPT

## 2017-06-08 PROCEDURE — 97032 APPL MODALITY 1+ESTIM EA 15: CPT | Performed by: PHYSICAL THERAPIST

## 2017-06-08 PROCEDURE — 97110 THERAPEUTIC EXERCISES: CPT | Performed by: PHYSICAL THERAPIST

## 2017-06-08 NOTE — PROGRESS NOTES
Outpatient Physical Therapy Ortho Progress Note  [facility] [place]     Patient Name: Shania Sweeney  : 1965  MRN: [id number]  Today's Date: 2017    Attendance 10/12.  20 per calendar year.  MD follow up is . 25% improvement since eval,    Visit Date: 2017    Visit Dx:    ICD-10-CM ICD-9-CM   1. 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   • Anxiety   • Neck pain   • Brachial neuritis   • Bronchitis        Past Medical History:   Diagnosis Date   • Acute bronchitis     Improving   • Anxiety    • 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    • Chronic back pain     C spine surgery   • Chronic headache disorder    • Cough    • Degenerative joint disease involving multiple joints    • Rossana-Danlos syndrome    • Hip pain    • Hyperlipidemia     Unspecified   • Insomnia    • Lumbosacral neuritis    • Multiple joint pain    • Neck pain    • Need for prophylactic vaccination and inoculation against influenza    • Obesity    • Pain in joint involving shoulder region     Left   • Rash and nonspecific skin eruption     Rash and other nonspecific skin eruption      • Spasm of back muscles    • Spasm of muscle         Past Surgical History:   Procedure Laterality Date   • APPENDECTOMY     • CERVICAL SPINE SURGERY N/A    • CHOLECYSTECTOMY     • FACIAL RECONSTRUCTION SURGERY N/A    • HYSTERECTOMY     • INJECTION OF MEDICATION  2015    Depo Medrol (Methylprednisone) 80mg    • INJECTION OF MEDICATION  2015    Toradol   • LUMBAR SPINE SURGERY N/A    • PROCEDURE GENERIC CONVERTED   05/24/2013    Nebulizer Treatment   • RECONSTRUCTION OF NOSE N/A    • SHOULDER ROTATOR CUFF REPAIR Right     x 2   • TONSILLECTOMY     • TRIGGER POINT INJECTION  03/02/2016    [place] Pt Inj, sing/multi pts. 3/+ muscles              PT Ortho       06/08/17 1400    Precautions and Contraindications    Precautions/Limitations no known precautions/limitations  -TM    Subjective Pain    Able to rate subjective pain? yes  -TM    Pre-Treatment Pain Level 3  -TM    Post-Treatment Pain Level 1  -DD    Posture/Observations    Posture/Observations Comments gait mildly antalgic with SC & ASO  -TM    Special Tests/Palpation    Special Tests/Palpation --   TTP of ATF, TTP 3-4 distal Metatarsals dorsum.  -DD    Left Ankle    Dorsiflexion AROM Deficit  2°  -DD    Plantarflexion AROM Deficit 46°  -DD    Inversion AROM Deficit 33°  -DD    Eversion AROM Deficit 18°  -DD    Left Ankle/Foot    Ankle PF Gross Movement (4+/5) good plus  -DD    Ankle Dorsiflexion Gross Movement (4/5) good  -DD    Subtalar Inversion Gross Movement (4+/5) good plus  -DD    Subtalar Eversion Gross Movement (4-/5) good minus  -DD      06/06/17 1300    Precautions and Contraindications    Precautions/Limitations no known precautions/limitations  -TM    Subjective Pain    Able to rate subjective pain? yes  -TM    Pre-Treatment Pain Level 3  -TM    Post-Treatment Pain Level 0  -TM    Posture/Observations    Posture/Observations Comments gait mildly antalgic with SC & ASO  -TM      User Key  (r) = Recorded By, (t) = Taken By, (c) = Cosigned By    Initials Name Provider Type    TM Ivanna Sams, PTA Physical Therapy Assistant    DD Michell Daugherty, PT Physical Therapist                PT Assessment/Plan       06/08/17 1606       PT Assessment    Functional Limitations Impaired gait;Performance in self-care ADL;Limitations in functional capacity and performance;Performance in leisure activities  -DD     Impairments Gait;Pain;Range of  motion;Balance;Coordination;Endurance  -DD     Assessment Comments Pt shows minimal change in ROM.  Soft tissue is reactive to palpation.  -DD     Please refer to paper survey for additional self-reported information No  -DD     Rehab Potential Good  -DD     Patient/caregiver participated in establishment of treatment plan and goals Yes  -DD     Patient would benefit from skilled therapy intervention Yes  -DD     PT Plan    Predicted Duration of Therapy Intervention (days/wks) 2 weeks  -DD     Planned CPT's? PT GAIT TRAINING EA 15 MIN: 06797;PT THER PROC EA 15 MIN: 55836;PT ULTRASOUND EA 15 MIN: 69060;PT ELECTRICAL STIM UNATTEND:   -DD     PT Plan Comments Continue therapy with focus of DF ROM and ankle strengthening. Modalities.  -DD       User Key  (r) = Recorded By, (t) = Taken By, (c) = Cosigned By    Initials Name Provider Type    JAMISON Daugherty, PT Physical Therapist                Modalities       06/08/17 1300          Ice    Location L foot/ankle  -DD      Ice S/P Rx Yes  -DD      ELECTRICAL STIMULATION    Attended/Unattended Unattended  -DD      Stimulation Type IFC  -DD      Location/Electrode Placement/Other Left ankle w/ Ice  -DD      Rx Minutes 20 mins  -DD      Ultrasound 22493    Location L lateral foot/ankle  -TM      Rx Minutes 8  -TM      Duty Cycle 100  -TM      Frequency 3.0 MHz  -TM      Intensity - Wts/cm 1.5  -TM        User Key  (r) = Recorded By, (t) = Taken By, (c) = Cosigned By    Initials Name Provider Type     Ivanna Sams, PTA Physical Therapy Assistant    JAMISON Daugherty, PT Physical Therapist                Exercises       06/08/17 1400          Subjective Comments    Subjective Comments Conts with pain and swelling.  -TM      Subjective Pain    Able to rate subjective pain? yes  -TM      Pre-Treatment Pain Level 3  -TM      Post-Treatment Pain Level 1  -DD      Exercise 1    Exercise Name 1 HC stretch with strap  -TM      Reps 1 3  -TM      Time  "(Seconds) 1 30\"  -TM      Exercise 2    Exercise Name 2 PRO II for ROM  -TM      Resistance 2 --   3.0  -TM      Time (Minutes) 2 8  -TM      Exercise 3    Exercise Name 3 TBand 4 way  -TM      Equipment 3 Theraband  -TM      Resistance 3 Red  -TM      Sets 3 2  -TM      Reps 3 15  -TM      Exercise 4    Exercise Name 4 ankle ABCs  -TM      Sets 4 1  -TM      Exercise 5    Exercise Name 5 step up and over- 4 inch  -TM      Sets 5 2  -TM      Reps 5 10  -TM      Exercise 6    Exercise Name 6 lateral step ups- 4 inch  -TM      Sets 6 2  -TM      Reps 6 10  -TM      Exercise 7    Exercise Name 7 PROM and joint play of ankle joint/palpation of soft tissue  -DD      Time (Minutes) 7 8 min  -DD        User Key  (r) = Recorded By, (t) = Taken By, (c) = Cosigned By    Initials Name Provider Type    TM Ivanna Sams, PTA Physical Therapy Assistant    DD Michell Daugherty, PT Physical Therapist                  PT OP Goals       06/08/17 1617 06/08/17 1430    PT Short Term Goals    STG Date to Achieve  06/01/17  -DD    STG 1  Tolerate a 45 minute treatment session with no increase in pain.  -DD    STG 1 Progress  Progressing  -DD    STG 2  L ankle DF to 5 or greater.  -DD    STG 2 Progress  Progressing  -DD    STG 2 Progress Comments  2  -DD    STG 3  Increase L eversion strength to 4-/5 or greater.  -DD    STG 3 Progress  Met  -DD    STG 4  L SLS EO 30 seconds or greater.  -DD    STG 4 Progress  Ongoing  -DD    STG 4 Progress Comments  3-4  -DD    Long Term Goals    LTG Date to Achieve  06/01/17  -DD    LTG 1  60% improvement or greater.  -DD    LTG 1 Progress  Progressing  -DD    LTG 2  Ambulate with non-antalgic gait.  -DD    LTG 2 Progress  Progressing  -DD    LTG 3  Decrease use of ankle brace to 3 hours per day or less.  -DD    LTG 3 Progress  Ongoing  -DD    LTG 4  LEFS to 16 or less.  -DD    LTG 4 Progress  Met  -DD    LTG 4 Progress Comments  26  -DD    LTG 5  L SLS EO 1 minute or greater.  -DD    LTG 5 " Progress  Progressing  -DD    LTG 6  L ankle DF to 12 or greater.  -DD    LTG 6 Progress  Progressing  -DD    LTG 7  L ankle PF to 50 or greater.  -DD    LTG 7 Progress  Progressing  -DD    LTG 8  Increase L eversion strength to 4+/5 or greater.  -DD    LTG 8 Progress  Progressing  -DD    LTG 9  LEFS increased to 36/80  -DD    Time Calculation    PT Goal Re-Cert Due Date 06/29/17  -DD       User Key  (r) = Recorded By, (t) = Taken By, (c) = Cosigned By    Initials Name Provider Type    DD Michell Daugherty, PT Physical Therapist                    Outcome Measures       06/08/17 1500          Lower Extremity Functional Index    Any of your usual work, housework or school activities 2  -DD      Your usual hobbies, recreational or sporting activities 0  -DD      Getting into or out of the bath 3  -DD      Walking between rooms 3  -DD      Putting on your shoes or socks 4  -DD      Squatting 0  -DD      Lifting an object, like a bag of groceries from the floor 2  -DD      Performing light activities around your home 3  -DD      Performing heavy activities around your home 0  -DD      Getting into or out of a car 2  -DD      Walking 2 blocks 0  -DD      Walking a mile 0  -DD      Going up or down 10 stairs (about 1 flight of stairs) 0  -DD      Standing for 1 hour 0  -DD      Sitting for 1 hour 3  -DD      Running on even ground 0  -DD      Running on uneven ground 0  -DD      Making sharp turns while running fast 0  -DD      Hopping 0  -DD      Rolling over in bed 4  -DD      Total 26  -DD      Functional Assessment    Outcome Measure Options Lower Extremity Functional Scale (LEFS)  -DD        User Key  (r) = Recorded By, (t) = Taken By, (c) = Cosigned By    Initials Name Provider Type    JAMISON Daugherty, PT Physical Therapist            Time Calculation:   Start Time: 1345  Stop Time: 1443  Time Calculation (min): 58 min  Total Timed Code Minutes- PT: 38 minute(s)    Therapy Charges for Today     Code  Description Service Date Service Provider Modifiers Qty    76241687447 HC PT ELEC STIM EA-PER 15 MIN 6/8/2017 Michell Daugherty, PT GP 1    30771373544 HC PT THER PROC EA 15 MIN 6/8/2017 Michell Daugherty, PT GP 1      US and first 6 exercises done by Ivanna Sams PTA.    PT G-Codes  Outcome Measure Options: Lower Extremity Functional Scale (LEFS)         Michell Daugherty, PT,ATC,DPT  6/8/2017

## 2017-06-13 DIAGNOSIS — F41.9 ANXIETY: ICD-10-CM

## 2017-06-13 RX ORDER — CLONAZEPAM 1 MG/1
TABLET ORAL
Qty: 90 TABLET | Refills: 0 | OUTPATIENT
Start: 2017-06-13

## 2017-06-13 RX ORDER — CLONAZEPAM 1 MG/1
TABLET ORAL
Qty: 90 TABLET | Refills: 0 | Status: SHIPPED | OUTPATIENT
Start: 2017-06-13 | End: 2017-08-07 | Stop reason: SDUPTHER

## 2017-06-15 ENCOUNTER — HOSPITAL ENCOUNTER (OUTPATIENT)
Dept: PHYSICAL THERAPY | Facility: HOSPITAL | Age: 52
Setting detail: THERAPIES SERIES
Discharge: HOME | End: 2017-06-15

## 2017-06-15 DIAGNOSIS — S92.355D CLOSED NONDISPLACED FRACTURE OF FIFTH METATARSAL BONE OF LEFT FOOT WITH ROUTINE HEALING, SUBSEQUENT ENCOUNTER: Primary | ICD-10-CM

## 2017-06-15 PROCEDURE — G0283 ELEC STIM OTHER THAN WOUND: HCPCS

## 2017-06-15 PROCEDURE — 97110 THERAPEUTIC EXERCISES: CPT

## 2017-06-15 PROCEDURE — 97035 APP MDLTY 1+ULTRASOUND EA 15: CPT

## 2017-06-15 NOTE — PROGRESS NOTES
Outpatient Physical Therapy Ortho Treatment Note   [place] [facility]     Patient Name: Shania Sweeney  : 1965  MRN: [id number]  Today's Date: 6/15/2017      Visit Date: 06/15/2017    Subjective Improvement:  25%     Attendance:   Approved:     20 Visits per year      MD follow up:  17          date:   17      Visit Dx:    ICD-10-CM ICD-9-CM   1. 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   • Anxiety   • Neck pain   • Brachial neuritis   • Bronchitis        Past Medical History:   Diagnosis Date   • Acute bronchitis     Improving   • Anxiety    • 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    • Chronic back pain     C spine surgery   • Chronic headache disorder    • Cough    • Degenerative joint disease involving multiple joints    • Rossana-Danlos syndrome    • Hip pain    • Hyperlipidemia     Unspecified   • Insomnia    • Lumbosacral neuritis    • Multiple joint pain    • Neck pain    • Need for prophylactic vaccination and inoculation against influenza    • Obesity    • Pain in joint involving shoulder region     Left   • Rash and nonspecific skin eruption     Rash and other nonspecific skin eruption      • Spasm of back muscles    • Spasm of muscle         Past Surgical History:   Procedure Laterality Date   • APPENDECTOMY     • CERVICAL SPINE SURGERY N/A    • CHOLECYSTECTOMY     • FACIAL RECONSTRUCTION SURGERY N/A    • HYSTERECTOMY     • INJECTION OF MEDICATION  2015    Depo Medrol (Methylprednisone) 80mg    • INJECTION OF MEDICATION  2015    Toradol    • LUMBAR SPINE SURGERY N/A    • PROCEDURE GENERIC CONVERTED  05/24/2013    Nebulizer Treatment   • RECONSTRUCTION OF NOSE N/A    • SHOULDER ROTATOR CUFF REPAIR Right     x 2   • TONSILLECTOMY     • TRIGGER POINT INJECTION  03/02/2016    [place] Pt Inj, sing/multi pts. 3/+ muscles              PT Ortho       06/15/17 1300    Subjective Comments    Subjective Comments Has been sick this week.  Pain not too bad today.  Knee still bothering her.  -TM    Precautions and Contraindications    Precautions/Limitations no known precautions/limitations  -TM    Subjective Pain    Able to rate subjective pain? yes  -TM    Pre-Treatment Pain Level 2  -TM    Posture/Observations    Posture/Observations Comments gait mildly antalgic w/ASO  -TM      User Key  (r) = Recorded By, (t) = Taken By, (c) = Cosigned By    Initials Name Provider Type     Ivanna Sams PTA Physical Therapy Assistant                            PT Assessment/Plan       06/15/17 1300       PT Assessment    Functional Limitations Impaired gait;Performance in self-care ADL;Limitations in functional capacity and performance;Performance in leisure activities  -TM     Impairments Gait;Pain;Range of motion;Balance;Coordination;Endurance  -TM     Assessment Comments Pt reported pain increase with CR/TR & step ups  -TM     Rehab Potential Good  -TM     Patient/caregiver participated in establishment of treatment plan and goals Yes  -TM     Patient would benefit from skilled therapy intervention Yes  -TM     PT Plan    Predicted Duration of Therapy Intervention (days/wks) 2 weeks  -TM     PT Plan Comments Continue ankle strengthening as tolerated.  -TM       User Key  (r) = Recorded By, (t) = Taken By, (c) = Cosigned By    Initials Name Provider Type     Ivanna Sams PTA Physical Therapy Assistant                Modalities       06/15/17 1300          Subjective Pain    Post-Treatment Pain Level 5  -TM      Ice    Location L foot/ankle  -TM      Ice S/P  Rx Yes  -TM      ELECTRICAL STIMULATION    Attended/Unattended Unattended  -TM      Stimulation Type IFC  -TM      Location/Electrode Placement/Other Left ankle w/ Ice  -TM      Rx Minutes 20 mins  -TM      Ultrasound 10794    Location L lateral foot/ankle  -TM      Rx Minutes 8  -TM      Duty Cycle 100  -TM      Frequency 3.0 MHz  -TM      Intensity - Wts/cm 1.5  -TM        User Key  (r) = Recorded By, (t) = Taken By, (c) = Cosigned By    Initials Name Provider Type    TM Ivanna Sams PTA Physical Therapy Assistant                Exercises       06/15/17 1300          Subjective Comments    Subjective Comments Has been sick this week.  Pain not too bad today.  Knee still bothering her.  -TM      Subjective Pain    Able to rate subjective pain? yes  -TM      Pre-Treatment Pain Level 2  -TM      Post-Treatment Pain Level 5  -TM      Exercise 1    Exercise Name 1 PRO II for ROM  -TM      Resistance 1 --   3.0  -TM      Time (Minutes) 1 8  -TM      Exercise 2    Exercise Name 2 incline stretch  -TM      Reps 2 3  -TM      Time (Seconds) 2 30  -TM      Exercise 3    Exercise Name 3 TBand 4 way ankle  -TM      Equipment 3 Theraband  -TM      Resistance 3 Red  -TM      Sets 3 2  -TM      Reps 3 15  -TM      Exercise 4    Exercise Name 4 marble pickups  -TM      Sets 4 1  -TM      Exercise 5    Exercise Name 5 step up and over  -TM      Sets 5 2  -TM      Reps 5 10  -TM      Exercise 6    Exercise Name 6 lateral step ups  -TM      Sets 6 2  -TM      Reps 6 10  -TM      Exercise 7    Exercise Name 7 standing CR/TR  -TM      Sets 7 2  -TM      Reps 7 10  -TM      Exercise 8    Exercise Name 8 Airex stance NBOS  -TM      Reps 8 2  -TM      Time (Seconds) 8 30  -TM        User Key  (r) = Recorded By, (t) = Taken By, (c) = Cosigned By    Initials Name Provider Type    TM Ivanna Sams PTA Physical Therapy Assistant                               PT OP Goals       06/15/17 1300       PT Short Term Goals    STG Date  to Achieve 06/01/17  -TM     STG 1 Tolerate a 45 minute treatment session with no increase in pain.  -TM     STG 1 Progress Progressing  -TM     STG 2 L ankle DF to 5 or greater.  -TM     STG 2 Progress Progressing  -TM     STG 3 Increase L eversion strength to 4-/5 or greater.  -TM     STG 3 Progress Met  -TM     STG 4 L SLS EO 30 seconds or greater.  -TM     STG 4 Progress Ongoing  -TM     Long Term Goals    LTG Date to Achieve 06/01/17  -TM     LTG 1 60% improvement or greater.  -TM     LTG 1 Progress Progressing  -TM     LTG 2 Ambulate with non-antalgic gait.  -TM     LTG 2 Progress Progressing  -TM     LTG 3 Decrease use of ankle brace to 3 hours per day or less.  -TM     LTG 3 Progress Ongoing  -TM     LTG 4 LEFS to 16 or less.  -TM     LTG 4 Progress Met  -TM     LTG 5 L SLS EO 1 minute or greater.  -TM     LTG 5 Progress Progressing  -TM     LTG 6 L ankle DF to 12 or greater.  -TM     LTG 6 Progress Progressing  -TM     LTG 7 L ankle PF to 50 or greater.  -TM     LTG 7 Progress Progressing  -TM     LTG 8 Increase L eversion strength to 4+/5 or greater.  -TM     LTG 8 Progress Progressing  -TM     LTG 9 LEFS increased to 36/80  -TM       User Key  (r) = Recorded By, (t) = Taken By, (c) = Cosigned By    Initials Name Provider Type    TM Ivanna Sams PTA Physical Therapy Assistant                    Time Calculation:   Start Time: 1300  Stop Time: 1405  Time Calculation (min): 65 min  Total Timed Code Minutes- PT: 45 minute(s)    Therapy Charges for Today     Code Description Service Date Service Provider Modifiers Qty    74584463350 HC PT THER PROC EA 15 MIN 6/15/2017 Ivanna Sams, PTA GP 2    75735488260 HC PT ULTRASOUND EA 15 MIN 6/15/2017 Ivanna Sams, PTA GP 1    27575946564 HC PT ELECTRICAL STIM UNATTENDED 6/15/2017 Ivanna Sams, PTA  1                    Ivanna Sams, IVETT  6/15/2017

## 2017-06-19 DIAGNOSIS — G47.00 INSOMNIA, UNSPECIFIED TYPE: ICD-10-CM

## 2017-06-19 RX ORDER — CETIRIZINE HYDROCHLORIDE 10 MG/1
TABLET ORAL
Qty: 30 TABLET | Refills: 0 | Status: SHIPPED | OUTPATIENT
Start: 2017-06-19 | End: 2017-07-13 | Stop reason: SDUPTHER

## 2017-06-20 ENCOUNTER — HOSPITAL ENCOUNTER (OUTPATIENT)
Dept: PHYSICAL THERAPY | Facility: HOSPITAL | Age: 52
Setting detail: THERAPIES SERIES
Discharge: HOME | End: 2017-06-20

## 2017-06-20 DIAGNOSIS — S92.355D CLOSED NONDISPLACED FRACTURE OF FIFTH METATARSAL BONE OF LEFT FOOT WITH ROUTINE HEALING, SUBSEQUENT ENCOUNTER: Primary | ICD-10-CM

## 2017-06-20 PROCEDURE — G0283 ELEC STIM OTHER THAN WOUND: HCPCS

## 2017-06-20 PROCEDURE — 97035 APP MDLTY 1+ULTRASOUND EA 15: CPT

## 2017-06-20 PROCEDURE — 97110 THERAPEUTIC EXERCISES: CPT

## 2017-06-20 RX ORDER — ZOLPIDEM TARTRATE 10 MG/1
TABLET ORAL
Qty: 30 TABLET | Refills: 0 | Status: SHIPPED | OUTPATIENT
Start: 2017-06-20 | End: 2017-08-07 | Stop reason: SDUPTHER

## 2017-06-20 NOTE — PROGRESS NOTES
Outpatient Physical Therapy Ortho Treatment Note   Oli Yasir     Patient Name: Shania Sweeney  : 1965  MRN: [id number]  Today's Date: 2017      Visit Date: 2017    Subjective Improvement:     25%  Attendance: 12/15  Approved:   20 Visits per year      MD follow up:    17        date:     17      ICD-10-CM ICD-9-CM   1. 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   • Anxiety   • Neck pain   • Brachial neuritis   • Bronchitis        Past Medical History:   Diagnosis Date   • Acute bronchitis     Improving   • Anxiety    • 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    • Chronic back pain     C spine surgery   • Chronic headache disorder    • Cough    • Degenerative joint disease involving multiple joints    • Rossana-Danlos syndrome    • Hip pain    • Hyperlipidemia     Unspecified   • Insomnia    • Lumbosacral neuritis    • Multiple joint pain    • Neck pain    • Need for prophylactic vaccination and inoculation against influenza    • Obesity    • Pain in joint involving shoulder region     Left   • Rash and nonspecific skin eruption     Rash and other nonspecific skin eruption      • Spasm of back muscles    • Spasm of muscle         Past Surgical History:   Procedure Laterality Date   • APPENDECTOMY     • CERVICAL SPINE SURGERY N/A    • CHOLECYSTECTOMY     • FACIAL RECONSTRUCTION SURGERY N/A    • HYSTERECTOMY     • INJECTION OF MEDICATION  2015    Depo Medrol (Methylprednisone) 80mg    • INJECTION OF MEDICATION  2015    Toradol   • LUMBAR  SPINE SURGERY N/A    • PROCEDURE GENERIC CONVERTED  05/24/2013    Nebulizer Treatment   • RECONSTRUCTION OF NOSE N/A    • SHOULDER ROTATOR CUFF REPAIR Right     x 2   • TONSILLECTOMY     • TRIGGER POINT INJECTION  03/02/2016    [place] Pt Inj, sing/multi pts. 3/+ muscles              PT Ortho       06/20/17 1500    Subjective Comments    Subjective Comments [place] & [place] steps and sand caused increased pain.  -TM    Precautions and Contraindications    Precautions/Limitations no known precautions/limitations  -TM    Subjective Pain    Able to rate subjective pain? yes  -TM    Pre-Treatment Pain Level 5  -TM    Posture/Observations    Posture/Observations Comments min antalgic gait with ASO & SC  -TM      User Key  (r) = Recorded By, (t) = Taken By, (c) = Cosigned By    Initials Name Provider Type     Ivanna Sams PTA Physical Therapy Assistant                            PT Assessment/Plan       06/20/17 1600       PT Assessment    Functional Limitations Impaired gait;Performance in self-care ADL;Limitations in functional capacity and performance;Performance in leisure activities  -TM     Impairments Gait;Pain;Range of motion;Balance;Coordination;Endurance  -TM     Assessment Comments Pt usign SC again.  Reported pain being increased & didn't want to try PRO II or standing exercises today.  -TM     Rehab Potential Good  -TM     Patient/caregiver participated in establishment of treatment plan and goals Yes  -TM     Patient would benefit from skilled therapy intervention Yes  -TM     PT Plan    PT Frequency 2x/week  -TM     Predicted Duration of Therapy Intervention (days/wks) 1 more week  -TM     PT Plan Comments Cont per POC as tolerated.  -TM       User Key  (r) = Recorded By, (t) = Taken By, (c) = Cosigned By    Initials Name Provider Type     Ivanna Sams PTA Physical Therapy Assistant                Modalities       06/20/17 1500          Ice    Location L foot/ankle  -TM       Ice S/P Rx Yes  -TM      ELECTRICAL STIMULATION    Attended/Unattended Unattended  -TM      Stimulation Type IFC  -TM      Location/Electrode Placement/Other Left ankle w/ Ice  -TM      Rx Minutes 20 mins  -TM      Ultrasound [id number]    Location L lateral foot/ankle  -TM      Rx Minutes 8  -TM      Duty Cycle 100  -TM      Frequency 3.0 MHz  -TM      Intensity - Wts/cm 1.5  -TM        User Key  (r) = Recorded By, (t) = Taken By, (c) = Cosigned By    Initials Name Provider Type     Ivanna Sams PTA Physical Therapy Assistant                Exercises       06/20/17 1500          Subjective Comments    Subjective Comments [place] & the steps and sand caused increased pain.  -TM      Subjective Pain    Able to rate subjective pain? yes  -TM      Pre-Treatment Pain Level 5  -TM      Exercise 1    Exercise Name 1 HC stretch with strap  -TM      Reps 1 3  -TM      Time (Seconds) 1 30  -TM      Exercise 2    Exercise Name 2 TBand 4 way ankle  -TM      Equipment 2 Theraband  -TM      Resistance 2 Red  -TM      Sets 2 2  -TM      Reps 2 10  -TM      Exercise 3    Exercise Name 3 AROM DF, PF, EV, IV  -TM      Sets 3 2  -TM      Reps 3 10  -TM      Exercise 4    Exercise Name 4 towel scrunches  -TM      Time (Minutes) 4 2  -TM      Exercise 5    Exercise Name 5 marble pickup  -TM      Time (Minutes) 5 2  -TM        User Key  (r) = Recorded By, (t) = Taken By, (c) = Cosigned By    Initials Name Provider Type     Ivanna Sams PTA Physical Therapy Assistant                               PT OP Goals       06/20/17 1600       PT Short Term Goals    STG Date to Achieve 06/01/17  -TM     STG 1 Tolerate a 45 minute treatment session with no increase in pain.  -TM     STG 1 Progress Progressing  -TM     STG 2 L ankle DF to 5 or greater.  -TM     STG 2 Progress Progressing  -TM     STG 3 Increase L eversion strength to 4-/5 or greater.  -TM     STG 3 Progress Met  -TM     STG 4 L SLS EO 30  seconds or greater.  -     STG 4 Progress Ongoing  -TM     Long Term Goals    LTG Date to Achieve 06/01/17  -TM     LTG 1 60% improvement or greater.  -TM     LTG 1 Progress Progressing  -TM     LTG 2 Ambulate with non-antalgic gait.  -TM     LTG 2 Progress Progressing  -TM     LTG 3 Decrease use of ankle brace to 3 hours per day or less.  -TM     LTG 3 Progress Ongoing  -TM     LTG 4 LEFS to 16 or less.  -TM     LTG 4 Progress Met  -TM     LTG 5 L SLS EO 1 minute or greater.  -TM     LTG 5 Progress Progressing  -TM     LTG 6 L ankle DF to 12 or greater.  -TM     LTG 6 Progress Progressing  -TM     LTG 7 L ankle PF to 50 or greater.  -TM     LTG 7 Progress Progressing  -TM     LTG 8 Increase L eversion strength to 4+/5 or greater.  -TM     LTG 8 Progress Progressing  -TM     LTG 9 LEFS increased to 36/80  -       User Key  (r) = Recorded By, (t) = Taken By, (c) = Cosigned By    Initials Name Provider Type     Ivanna Sams PTA Physical Therapy Assistant                    Time Calculation:   Start Time: 1515  Stop Time: 1621  Time Calculation (min): 66 min  Total Timed Code Minutes- PT: 46 minute(s)    Therapy Charges for Today     Code Description Service Date Service Provider Modifiers Qty    03283811202 HC PT ULTRASOUND EA 15 MIN 6/20/2017 Ivanna Sams PTA GP 1    29391146923 HC PT THER PROC EA 15 MIN 6/20/2017 Ivanna Sams PTA GP 2    26170389060 HC PT ELECTRICAL STIM UNATTENDED 6/20/2017 Ivanna Sams PTA  1                    Ivanna Sams PTA  6/20/2017

## 2017-06-22 ENCOUNTER — HOSPITAL ENCOUNTER (OUTPATIENT)
Dept: PHYSICAL THERAPY | Facility: HOSPITAL | Age: 52
Setting detail: THERAPIES SERIES
Discharge: HOME | End: 2017-06-22

## 2017-06-22 DIAGNOSIS — S92.355D CLOSED NONDISPLACED FRACTURE OF FIFTH METATARSAL BONE OF LEFT FOOT WITH ROUTINE HEALING, SUBSEQUENT ENCOUNTER: Primary | ICD-10-CM

## 2017-06-22 PROCEDURE — G0283 ELEC STIM OTHER THAN WOUND: HCPCS

## 2017-06-22 PROCEDURE — 97110 THERAPEUTIC EXERCISES: CPT

## 2017-06-22 PROCEDURE — 97035 APP MDLTY 1+ULTRASOUND EA 15: CPT

## 2017-06-22 NOTE — PROGRESS NOTES
Outpatient Physical Therapy Ortho Treatment Note   Oli Yasir     Patient Name: Shania Sweeney  : 1965  MRN: [id number]  Today's Date: 2017      Visit Date: 2017    Subjective Improvement:    25%   Attendance:   Approved:     20 isits per year      MD follow up:  17          date:  17      Visit Dx:    ICD-10-CM ICD-9-CM   1. 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   • Anxiety   • Neck pain   • Brachial neuritis   • Bronchitis        Past Medical History:   Diagnosis Date   • Acute bronchitis     Improving   • Anxiety    • 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    • Chronic back pain     C spine surgery   • Chronic headache disorder    • Cough    • Degenerative joint disease involving multiple joints    • Rossana-Danlos syndrome    • Hip pain    • Hyperlipidemia     Unspecified   • Insomnia    • Lumbosacral neuritis    • Multiple joint pain    • Neck pain    • Need for prophylactic vaccination and inoculation against influenza    • Obesity    • Pain in joint involving shoulder region     Left   • Rash and nonspecific skin eruption     Rash and other nonspecific skin eruption      • Spasm of back muscles    • Spasm of muscle         Past Surgical History:   Procedure Laterality Date   • APPENDECTOMY     • CERVICAL SPINE SURGERY N/A    • CHOLECYSTECTOMY     • FACIAL RECONSTRUCTION SURGERY N/A    • HYSTERECTOMY     • INJECTION OF MEDICATION  2015    Depo Medrol (Methylprednisone) 80mg    • INJECTION OF MEDICATION  2015    Toradol   •  LUMBAR SPINE SURGERY N/A    • PROCEDURE GENERIC CONVERTED  05/24/2013    Nebulizer Treatment   • RECONSTRUCTION OF NOSE N/A    • SHOULDER ROTATOR CUFF REPAIR Right     x 2   • TONSILLECTOMY     • TRIGGER POINT INJECTION  03/02/2016    [place] Pt Inj, sing/multi pts. 3/+ muscles              PT Ortho       06/22/17 1300    Subjective Comments    Subjective Comments Reports pain not any better today.  -TM    Precautions and Contraindications    Precautions/Limitations no known precautions/limitations  -TM    Subjective Pain    Able to rate subjective pain? yes  -TM    Pre-Treatment Pain Level 6  -TM    Posture/Observations    Posture/Observations Comments min antalgic gait with ASO & SC  -TM      06/20/17 1500    Subjective Comments    Subjective Comments [place] & the steps and sand caused increased pain.  -TM    Precautions and Contraindications    Precautions/Limitations no known precautions/limitations  -TM    Subjective Pain    Able to rate subjective pain? yes  -TM    Pre-Treatment Pain Level 5  -TM    Post-Treatment Pain Level 4  -TM    Posture/Observations    Posture/Observations Comments min antalgic gait with ASO & SC  -TM      User Key  (r) = Recorded By, (t) = Taken By, (c) = Cosigned By    Initials Name Provider Type    TM Ivanna Sams PTA Physical Therapy Assistant                            PT Assessment/Plan       06/22/17 1400       PT Assessment    Functional Limitations Impaired gait;Performance in self-care ADL;Limitations in functional capacity and performance;Performance in leisure activities  -TM     Impairments Gait;Pain;Range of motion;Balance;Coordination;Endurance  -TM     Assessment Comments Pt's progress has been minimal. Decreased tolerance for therex since [place]   -TM     Rehab Potential Good  -TM     Patient/caregiver participated in establishment of treatment plan and goals Yes  -TM     Patient would benefit from  skilled therapy intervention Yes  -TM     PT Plan    PT Frequency 2x/week  -TM     PT Plan Comments Cont as advised by MD  -FOX       User Key  (r) = Recorded By, (t) = Taken By, (c) = Cosigned By    Initials Name Provider Type     Ivanna Sams PTA Physical Therapy Assistant                Modalities       06/22/17 1300          Subjective Pain    Post-Treatment Pain Level 0  -TM      Ice    Location L foot/ankle  -TM      Ice S/P Rx Yes  -TM      ELECTRICAL STIMULATION    Attended/Unattended Unattended  -TM      Stimulation Type IFC  -TM      Location/Electrode Placement/Other Left ankle w/ Ice  -TM      Rx Minutes 20 mins  -TM      Ultrasound [id number]    Location L lateral foot/ankle  -TM      Rx Minutes 8  -TM      Duty Cycle 100  -TM      Frequency 3.0 MHz  -TM      Intensity - Wts/cm 1.5  -TM        User Key  (r) = Recorded By, (t) = Taken By, (c) = Cosigned By    Initials Name Provider Type     Ivanna Sams PTA Physical Therapy Assistant                Exercises       06/22/17 1300          Subjective Comments    Subjective Comments Reports pain not any better today.  -TM      Subjective Pain    Able to rate subjective pain? yes  -TM      Pre-Treatment Pain Level 6  -TM      Post-Treatment Pain Level 0  -TM      Exercise 1    Exercise Name 1 HC stretch with strap  -TM      Reps 1 3  -TM      Time (Seconds) 1 30  -TM      Exercise 2    Exercise Name 2 TBand 4 way ankle  -TM      Equipment 2 Theraband  -TM      Resistance 2 Red  -TM      Sets 2 2  -TM      Reps 2 15  -TM      Exercise 3    Exercise Name 3 AROM DF, PF, EV, IV  -TM      Sets 3 2  -TM      Reps 3 15  -TM        User Key  (r) = Recorded By, (t) = Taken By, (c) = Cosigned By    Initials Name Provider Type     Ivanna Sams PTA Physical Therapy Assistant                        Manual Rx (last 36 hours)      Manual Treatments       06/22/17 1300          Manual Rx 1    Manual Rx 1 Location L ankle   -TM      Manual Rx 1 Type  posterior glide/MT glides/ manual stretch  -TM      Manual Rx 1 Duration 5'  -TM      Manual Rx 2    Manual Rx 2 Location manual HC stretch  -TM      Manual Rx 2 Duration 2 min  -TM        User Key  (r) = Recorded By, (t) = Taken By, (c) = Cosigned By    Initials Name Provider Type     Ivanna Sams PTA Physical Therapy Assistant                PT OP Goals       06/22/17 1500       PT Short Term Goals    STG Date to Achieve 06/01/17  -TM     STG 1 Tolerate a 45 minute treatment session with no increase in pain.  -TM     STG 1 Progress Not Met  -TM     STG 2 L ankle DF to 5 or greater.  -TM     STG 2 Progress Met  -TM     STG 3 Increase L eversion strength to 4-/5 or greater.  -TM     STG 3 Progress Met  -TM     STG 4 L SLS EO 30 seconds or greater.  -TM     STG 4 Progress Not Met  -TM     Long Term Goals    LTG Date to Achieve 06/01/17  -TM     LTG 1 60% improvement or greater.  -TM     LTG 1 Progress Not Met  -TM     LTG 2 Ambulate with non-antalgic gait.  -TM     LTG 2 Progress Not Met  -TM     LTG 3 Decrease use of ankle brace to 3 hours per day or less.  -TM     LTG 3 Progress Not Met  -TM     LTG 4 LEFS to 16 or less.  -TM     LTG 4 Progress Met  -TM     LTG 5 L SLS EO 1 minute or greater.  -TM     LTG 5 Progress Not Met  -TM     LTG 6 L ankle DF to 12 or greater.  -TM     LTG 6 Progress Progressing  -TM     LTG 7 L ankle PF to 50 or greater.  -TM     LTG 7 Progress Not Met  -TM     LTG 8 Increase L eversion strength to 4+/5 or greater.  -TM     LTG 8 Progress Not Met  -TM     LTG 9 LEFS increased to 36/80  -TM       User Key  (r) = Recorded By, (t) = Taken By, (c) = Cosigned By    Initials Name Provider Type     Ivanna Sams PTA Physical Therapy Assistant                    Time Calculation:   Start Time: 1345  Stop Time: 1450  Time Calculation (min): 65 min  Total Timed Code Minutes- PT: 45 minute(s)    Therapy Charges for Today     Code Description Service Date Service Provider Modifiers Qty     20296071483 HC PT ULTRASOUND EA 15 MIN 6/22/2017 Ivanna Cate Sams, PTA GP 1    30823779671 HC PT ELECTRICAL STIM UNATTENDED 6/22/2017 Ivannakanchan Sams, PTA  1    24382526718 HC PT THER PROC EA 15 MIN 6/22/2017 Ivannakanchan Sams, PTA GP 2                    Ivannakanchan Sams, PTA  6/22/2017

## 2017-07-10 ENCOUNTER — DOCUMENTATION (OUTPATIENT)
Dept: PHYSICAL THERAPY | Facility: HOSPITAL | Age: 52
End: 2017-07-10

## 2017-07-13 ENCOUNTER — OFFICE VISIT (OUTPATIENT)
Dept: FAMILY MEDICINE CLINIC | Facility: CLINIC | Age: 52
End: 2017-07-13

## 2017-07-13 VITALS — DIASTOLIC BLOOD PRESSURE: 70 MMHG | HEIGHT: 72 IN | SYSTOLIC BLOOD PRESSURE: 108 MMHG | HEART RATE: 86 BPM

## 2017-07-13 DIAGNOSIS — J01.90 ACUTE NON-RECURRENT SINUSITIS, UNSPECIFIED LOCATION: Primary | ICD-10-CM

## 2017-07-13 DIAGNOSIS — B37.9 CANDIDIASIS: ICD-10-CM

## 2017-07-13 DIAGNOSIS — M54.17 LUMBOSACRAL NEURITIS: ICD-10-CM

## 2017-07-13 DIAGNOSIS — M25.50 MULTIPLE JOINT PAIN: ICD-10-CM

## 2017-07-13 DIAGNOSIS — R32 INCONTINENCE: ICD-10-CM

## 2017-07-13 DIAGNOSIS — G47.00 INSOMNIA, UNSPECIFIED TYPE: ICD-10-CM

## 2017-07-13 PROCEDURE — 99214 OFFICE O/P EST MOD 30 MIN: CPT | Performed by: NURSE PRACTITIONER

## 2017-07-13 RX ORDER — ALBUTEROL SULFATE 2.5 MG/3ML
2.5 SOLUTION RESPIRATORY (INHALATION) EVERY 4 HOURS PRN
Qty: 25 VIAL | Refills: 3 | Status: SHIPPED | OUTPATIENT
Start: 2017-07-13 | End: 2022-08-10 | Stop reason: SDUPTHER

## 2017-07-13 RX ORDER — HYDROCODONE BITARTRATE AND ACETAMINOPHEN 10; 325 MG/1; MG/1
TABLET ORAL
Qty: 120 TABLET | Refills: 0 | Status: SHIPPED | OUTPATIENT
Start: 2017-07-13 | End: 2017-08-28 | Stop reason: SDUPTHER

## 2017-07-13 RX ORDER — IMIPRAMINE HCL 50 MG
50 TABLET ORAL NIGHTLY
Qty: 30 TABLET | Refills: 2 | Status: SHIPPED | OUTPATIENT
Start: 2017-07-13 | End: 2017-11-03

## 2017-07-13 RX ORDER — TOLTERODINE 4 MG/1
CAPSULE, EXTENDED RELEASE ORAL
Refills: 3 | COMMUNITY
Start: 2017-06-28 | End: 2017-10-25 | Stop reason: SDUPTHER

## 2017-07-13 RX ORDER — FLUCONAZOLE 150 MG/1
150 TABLET ORAL ONCE
Qty: 2 TABLET | Refills: 0 | Status: SHIPPED | OUTPATIENT
Start: 2017-07-13 | End: 2017-07-13

## 2017-07-13 RX ORDER — CARISOPRODOL 350 MG/1
TABLET ORAL
Qty: 90 TABLET | Refills: 2 | Status: SHIPPED | OUTPATIENT
Start: 2017-07-13 | End: 2017-11-03

## 2017-07-13 RX ORDER — IMIPRAMINE HCL 25 MG
TABLET ORAL
Refills: 3 | COMMUNITY
Start: 2017-07-10 | End: 2017-07-13

## 2017-07-13 NOTE — PROGRESS NOTES
Subjective   Shania Sweeney is a 52 y.o. female.     Chief Complaint   Patient presents with   • Rash     x 1 week around nasal area and left groin area comes and goes       Rash   Associated symptoms include coughing. Pertinent negatives include no congestion, diarrhea, eye pain, fatigue, fever, rhinorrhea, shortness of breath, sore throat or vomiting.   Anxiety   Symptoms include dizziness, insomnia and nervous/anxious behavior. Patient reports no chest pain, confusion, nausea, palpitations, shortness of breath or suicidal ideas.       Insomnia   Associated symptoms include arthralgias, coughing, headaches, neck pain, numbness and a rash. Pertinent negatives include no abdominal pain, chest pain, chills, congestion, fatigue, fever, myalgias, nausea, sore throat, vomiting or weakness.        Here due to rash and to f/u on chronic neck and low back pain.     She has a rash to the nasal area and bumps to her groin area intermittently this week. She is taking an antibiotic for the boil and now she has a fungal type rashIn her inguinal area.  She has some sore spots to the right nostril.    The low back and left foot pain persists.  This and has been present for many months since her fall. She will see a foot and ankle specialist later this week.    Her urinary incontinence has not improved with Ditropan and Elavil. This [place] around 4[place]5 months [place]. She states she has to recline back a little when sitting up on the toilet in order to empty her bladder. She is seeing Dr. Metzger.    Dr. Cannon states she may need surgery on the disc herniation in the low back after surgery on the left ankle and left knee.     She is having depression since her setback. She is unable to be as active and is gaining weight.     She is having problems sleeping despite current medications.         The following portions of the patient's history were reviewed and updated as appropriate: allergies, past family  history, past medical history, past social history, past surgical history and problem list.  Current Outpatient Prescriptions on File Prior to Visit   Medication Sig   • albuterol (PROVENTIL HFA;VENTOLIN HFA) 108 (90 BASE) MCG/ACT inhaler Inhale 2 puffs 4 (Four) Times a Day As Needed for wheezing.   • baclofen (LIORESAL) 20 MG tablet TAKE 1 TABLET BY MOUTH 4  TIMES DAILY   • cetirizine (zyrTEC) 10 MG tablet Take 1 tablet by mouth Daily.   • clonazePAM (KlonoPIN) 1 MG tablet TAKE 1 TABLET BY MOUTH IN THE AM AND 2 TABLET BY MOUTH IN THE EVENING AS NEEDED FOR ANXIETY   • escitalopram (LEXAPRO) 20 MG tablet Take 1 tablet by mouth Daily.   • estrogens, conjugated, (PREMARIN) 1.25 MG tablet Take 1 tablet by mouth Daily.   • fluticasone (FLONASE) 50 MCG/ACT nasal spray 2 sprays into each nostril Daily.   • gabapentin (NEURONTIN) 800 MG tablet Take 1 tablet by mouth 3 (Three) Times a Day.   • KRILL OIL PO Take 1 capsule by mouth 2 (two) times a day. Krill Oil 1,000 mg-170 mg-50 mg-80 mg capsule   • montelukast (SINGULAIR) 10 MG tablet Take 1 po  hs   • oxybutynin XL (DITROPAN-XL) 10 MG 24 hr tablet Take 1 tablet by mouth Daily.   • zolpidem (AMBIEN) 10 MG tablet TAKE 1 TABLET BY MOUTH EVERY NIGHT AT BEDTIME AS NEEDED FOR SLEEP   • [DISCONTINUED] albuterol (PROVENTIL) (2.5 MG/3ML) 0.083% nebulizer solution Take 2.5 mg by nebulization Every 4 (Four) Hours As Needed for wheezing.   • [DISCONTINUED] amitriptyline (ELAVIL) 50 MG tablet Take 1 tablet by mouth Every Night.   • [DISCONTINUED] carisoprodol (SOMA) 350 MG tablet TAKE ONE PO TID PRN SPASM   • [DISCONTINUED] HYDROcodone-acetaminophen (NORCO)  MG per tablet Take 1/2 to 1 tablet by mouth every 6 hours as needed for pain   • benzonatate (TESSALON) 100 MG capsule Take 1 capsule by mouth 3 (Three) Times a Day As Needed for cough.   • lidocaine (LIDODERM) 5 % Place 1 patch on the skin Daily. Remove & Discard patch within 12 hours or as directed by MD   • phentermine  "37.5 MG capsule Take 1/2 tab daily   • tamsulosin (FLOMAX) 0.4 MG capsule 24 hr capsule Take 1 capsule by mouth Every Night.   • [DISCONTINUED] cetirizine (zyrTEC) 10 MG tablet TAKE 1 TABLET BY MOUTH DAILY     No current facility-administered medications on file prior to visit.      Review of Systems   Constitutional: Negative for activity change, chills, fatigue and fever.   HENT: Negative for congestion, rhinorrhea, sinus pressure and sore throat.    Eyes: Positive for visual disturbance. Negative for pain, discharge and itching.   Respiratory: Positive for cough. Negative for shortness of breath and wheezing.    Cardiovascular: Negative for chest pain, palpitations and leg swelling.   Gastrointestinal: Negative for abdominal pain, blood in stool, constipation, diarrhea, nausea and vomiting.   Endocrine: Negative for polydipsia and polyuria.   Genitourinary: Negative for dysuria, flank pain, frequency, hematuria and urgency.   Musculoskeletal: Positive for arthralgias, back pain, neck pain and neck stiffness. Negative for gait problem and myalgias.   Skin: Positive for rash.   Neurological: Positive for dizziness, speech difficulty, numbness and headaches. Negative for tremors, seizures, syncope and weakness.        Burning pain in both feet  \"firecraker\" pain in the right hand.   Hematological: Negative for adenopathy. Does not bruise/bleed easily.   Psychiatric/Behavioral: Positive for sleep disturbance. Negative for confusion, dysphoric mood and suicidal ideas. The patient is nervous/anxious and has insomnia.        Objective    Vitals:    07/13/17 1001   BP: 108/70   Pulse: 86   Weight: Comment: patient refused   Height: 72\" (182.9 cm)   PainSc:   5   PainLoc: Generalized     Physical Exam   Constitutional: She is oriented to person, place, and time. She appears well-developed and well-nourished. No distress.   Eyes: Conjunctivae and EOM are normal. Pupils are equal, round, and reactive to light. Right eye " exhibits no discharge. Left eye exhibits no discharge. No scleral icterus.   Neck: Neck supple. No thyromegaly present.   Cardiovascular: Normal rate, regular rhythm and normal heart sounds.    No murmur heard.  Pulmonary/Chest: Effort normal and breath sounds normal. No respiratory distress. She has no wheezes. She has no rales.   Musculoskeletal: She exhibits tenderness. She exhibits no edema or deformity.   Cervical pv muscle tenderness       Lymphadenopathy:     She has no cervical adenopathy.   Neurological: She is alert and oriented to person, place, and time. No cranial nerve deficit. Coordination normal.   Skin: Skin is warm and dry. No rash noted. No pallor.   Right great toenail with the distal section missing. The proximal section is intact but dark in color.    Psychiatric: She has a normal mood and affect. Her behavior is normal.   Nursing note and vitals reviewed.      Assessment/Plan   Shania was seen today for rash.    Diagnoses and all orders for this visit:    Acute non-recurrent sinusitis, unspecified location  -     mupirocin (BACTROBAN) 2 % ointment; Apply  topically 2 (Two) Times a Day. INSIDE NOSTRILS FOR 7 DAYS    Lumbosacral neuritis  -     carisoprodol (SOMA) 350 MG tablet; TAKE ONE PO TID PRN SPASM  -     HYDROcodone-acetaminophen (NORCO)  MG per tablet; Take 1/2 to 1 tablet by mouth every 6 hours as needed for pain    Multiple joint pain  -     HYDROcodone-acetaminophen (NORCO)  MG per tablet; Take 1/2 to 1 tablet by mouth every 6 hours as needed for pain    Candidiasis  -     fluconazole (DIFLUCAN) 150 MG tablet; Take 1 tablet by mouth 1 (One) Time for 1 dose. AND REPEAT IN 3 DAYS    Insomnia, unspecified type  -     imipramine (TOFRANIL) 50 MG tablet; Take 1 tablet by mouth Every Night.    Incontinence  -     imipramine (TOFRANIL) 50 MG tablet; Take 1 tablet by mouth Every Night.    Other orders  -     albuterol (PROVENTIL) (2.5 MG/3ML) 0.083% nebulizer solution; Take 2.5 mg  by nebulization Every 4 (Four) Hours As Needed for Wheezing.    Increased imipramine.  Medications refilled.  Begin Diflucan tablet for the fungal rash and begin Bactroban nasal ointment..     She will f/u with Dr. Cannon    as scheduled     Continue other medications. Reviewed potential medication side effects and benefits. Instructed to report side effects. Report if symptoms worsen or persist. Understanding expressed.    Counseling for depression encouraged and she will consider.     F/U  with me in 3 mos and prn.

## 2017-07-26 DIAGNOSIS — J40 BRONCHITIS: ICD-10-CM

## 2017-07-27 RX ORDER — BACLOFEN 20 MG/1
TABLET ORAL
Qty: 120 TABLET | Refills: 5 | Status: SHIPPED | OUTPATIENT
Start: 2017-07-27 | End: 2017-10-25 | Stop reason: SDUPTHER

## 2017-07-27 RX ORDER — FLUTICASONE PROPIONATE 50 MCG
SPRAY, SUSPENSION (ML) NASAL
Qty: 48 G | Refills: 5 | Status: SHIPPED | OUTPATIENT
Start: 2017-07-27 | End: 2017-10-25 | Stop reason: SDUPTHER

## 2017-07-31 ENCOUNTER — OFFICE VISIT (OUTPATIENT)
Dept: OBSTETRICS AND GYNECOLOGY | Facility: CLINIC | Age: 52
End: 2017-07-31

## 2017-07-31 VITALS
DIASTOLIC BLOOD PRESSURE: 76 MMHG | WEIGHT: 291.6 LBS | RESPIRATION RATE: 18 BRPM | HEIGHT: 72 IN | SYSTOLIC BLOOD PRESSURE: 128 MMHG | BODY MASS INDEX: 39.5 KG/M2 | HEART RATE: 95 BPM

## 2017-07-31 DIAGNOSIS — N90.5 VULVAR ATROPHY: Primary | ICD-10-CM

## 2017-07-31 PROCEDURE — 99213 OFFICE O/P EST LOW 20 MIN: CPT | Performed by: NURSE PRACTITIONER

## 2017-07-31 PROCEDURE — 87210 SMEAR WET MOUNT SALINE/INK: CPT | Performed by: NURSE PRACTITIONER

## 2017-07-31 NOTE — PROGRESS NOTES
Subjective   Shania Sweeney is a 52 y.o. female.     History of Present Illness   Pt presents with concerns about vaginal irritation that was noted on exam by urology. Pt had Hysterectomy w/BSO in 2006 for an extensive pelvic infection with IUD. She is on PO Premarin 1.25mg. Pt notes that in Jan 2017 she had a fall at a mall where she broke her foot, injured her knee and previously fused lumbar spine. She also developed incontinence. She is seeing Dr. Metzger for this. Someone from urology was performing a test and noted vaginal irritation and suggested vaginal estrogen. Pt denies burning, itching, irritation, or any symptoms related to atrophy or irritation.     The following portions of the patient's history were reviewed and updated as appropriate: allergies, current medications, past family history, past medical history, past social history, past surgical history and problem list.    Review of Systems   Constitutional: Negative.    Respiratory: Negative.    Cardiovascular: Negative.    Genitourinary: Positive for dyspareunia (not been sexually active in a very long time). Negative for dysuria, genital sores, vaginal discharge and vaginal pain.        Incontinence    Musculoskeletal: Positive for back pain and gait problem.       Objective   Physical Exam   Constitutional: She is oriented to person, place, and time. She appears well-developed and well-nourished.   Cardiovascular: Normal rate, regular rhythm and normal heart sounds.    Pulmonary/Chest: Effort normal and breath sounds normal.   Genitourinary: Vagina normal. No erythema in the vagina. No foreign body in the vagina. No vaginal discharge found.   Genitourinary Comments: Mild atrophy at introitus, vaginal walls moderate loss of rugation, well lubricated. Wet prep obtain: no yeast buds, hyphae, trichomonads. Normal epithelial cells. Evaluated by KHAO Quintana     Musculoskeletal:   Walking with cane   Neurological: She is alert and oriented  to person, place, and time.   Vitals reviewed.      Assessment/Plan   Shania was seen today for vaginal irritation.    Diagnoses and all orders for this visit:    Vulvar atrophy  -     conjugated estrogens (PREMARIN) 0.625 MG/GM vaginal cream; Insert 1 gram vaginally twice weekly     Begin 1gm Premarin cream 1-2 times weekly, apply some of this externally also. Continue follow ups with Urology, orthopedic and neurosurgery. RTC in 6 months for follow up.

## 2017-08-07 ENCOUNTER — OFFICE VISIT (OUTPATIENT)
Dept: FAMILY MEDICINE CLINIC | Facility: CLINIC | Age: 52
End: 2017-08-07

## 2017-08-07 ENCOUNTER — LAB (OUTPATIENT)
Dept: LAB | Facility: OTHER | Age: 52
End: 2017-08-07

## 2017-08-07 VITALS
TEMPERATURE: 97.2 F | OXYGEN SATURATION: 98 % | HEIGHT: 72 IN | HEART RATE: 84 BPM | SYSTOLIC BLOOD PRESSURE: 124 MMHG | DIASTOLIC BLOOD PRESSURE: 72 MMHG

## 2017-08-07 DIAGNOSIS — R09.89 THROAT CLEARING: ICD-10-CM

## 2017-08-07 DIAGNOSIS — K12.1 STOMATITIS: ICD-10-CM

## 2017-08-07 DIAGNOSIS — K21.9 GASTROESOPHAGEAL REFLUX DISEASE, ESOPHAGITIS PRESENCE NOT SPECIFIED: Primary | ICD-10-CM

## 2017-08-07 DIAGNOSIS — M54.9 SPINAL PAIN: ICD-10-CM

## 2017-08-07 DIAGNOSIS — R53.83 FATIGUE, UNSPECIFIED TYPE: ICD-10-CM

## 2017-08-07 DIAGNOSIS — G47.00 INSOMNIA, UNSPECIFIED TYPE: ICD-10-CM

## 2017-08-07 DIAGNOSIS — F33.1 MODERATE EPISODE OF RECURRENT MAJOR DEPRESSIVE DISORDER (HCC): ICD-10-CM

## 2017-08-07 DIAGNOSIS — F41.9 ANXIETY: ICD-10-CM

## 2017-08-07 LAB
ALBUMIN SERPL-MCNC: 4.1 G/DL (ref 3.2–5.5)
ALBUMIN/GLOB SERPL: 1.6 G/DL (ref 1–3)
ALP SERPL-CCNC: 97 U/L (ref 15–121)
ALT SERPL W P-5'-P-CCNC: 18 U/L (ref 10–60)
ANION GAP SERPL CALCULATED.3IONS-SCNC: 11 MMOL/L (ref 5–15)
AST SERPL-CCNC: 23 U/L (ref 10–60)
BASOPHILS # BLD AUTO: 0.03 10*3/MM3 (ref 0–0.2)
BASOPHILS NFR BLD AUTO: 0.5 % (ref 0–2)
BILIRUB SERPL-MCNC: 0.5 MG/DL (ref 0.2–1)
BUN BLD-MCNC: 17 MG/DL (ref 8–25)
BUN/CREAT SERPL: 17 (ref 7–25)
CALCIUM SPEC-SCNC: 9 MG/DL (ref 8.4–10.8)
CHLORIDE SERPL-SCNC: 99 MMOL/L (ref 100–112)
CO2 SERPL-SCNC: 29 MMOL/L (ref 20–32)
CREAT BLD-MCNC: 1 MG/DL (ref 0.4–1.3)
DEPRECATED RDW RBC AUTO: 44 FL (ref 36.4–46.3)
EOSINOPHIL # BLD AUTO: 0.16 10*3/MM3 (ref 0–0.7)
EOSINOPHIL NFR BLD AUTO: 2.7 % (ref 0–7)
ERYTHROCYTE [DISTWIDTH] IN BLOOD BY AUTOMATED COUNT: 12.6 % (ref 11.5–14.5)
GFR SERPL CREATININE-BSD FRML MDRD: 58 ML/MIN/1.73 (ref 51–120)
GLOBULIN UR ELPH-MCNC: 2.5 GM/DL (ref 2.5–4.6)
GLUCOSE BLD-MCNC: 88 MG/DL (ref 70–100)
HCT VFR BLD AUTO: 44.2 % (ref 35–45)
HGB BLD-MCNC: 14.3 G/DL (ref 12–15.5)
LYMPHOCYTES # BLD AUTO: 2.22 10*3/MM3 (ref 0.6–4.2)
LYMPHOCYTES NFR BLD AUTO: 37.7 % (ref 10–50)
MCH RBC QN AUTO: 31.6 PG (ref 26.5–34)
MCHC RBC AUTO-ENTMCNC: 32.4 G/DL (ref 31.4–36)
MCV RBC AUTO: 97.8 FL (ref 80–98)
MONOCYTES # BLD AUTO: 0.58 10*3/MM3 (ref 0–0.9)
MONOCYTES NFR BLD AUTO: 9.8 % (ref 0–12)
NEUTROPHILS # BLD AUTO: 2.9 10*3/MM3 (ref 2–8.6)
NEUTROPHILS NFR BLD AUTO: 49.3 % (ref 37–80)
PLATELET # BLD AUTO: 236 10*3/MM3 (ref 150–450)
PMV BLD AUTO: 10.5 FL (ref 8–12)
POTASSIUM BLD-SCNC: 4 MMOL/L (ref 3.4–5.4)
PROT SERPL-MCNC: 6.6 G/DL (ref 6.7–8.2)
RBC # BLD AUTO: 4.52 10*6/MM3 (ref 3.77–5.16)
SODIUM BLD-SCNC: 139 MMOL/L (ref 134–146)
WBC NRBC COR # BLD: 5.89 10*3/MM3 (ref 3.2–9.8)

## 2017-08-07 PROCEDURE — 36415 COLL VENOUS BLD VENIPUNCTURE: CPT | Performed by: NURSE PRACTITIONER

## 2017-08-07 PROCEDURE — 96372 THER/PROPH/DIAG INJ SC/IM: CPT | Performed by: NURSE PRACTITIONER

## 2017-08-07 PROCEDURE — 84436 ASSAY OF TOTAL THYROXINE: CPT | Performed by: NURSE PRACTITIONER

## 2017-08-07 PROCEDURE — 82607 VITAMIN B-12: CPT | Performed by: NURSE PRACTITIONER

## 2017-08-07 PROCEDURE — 84443 ASSAY THYROID STIM HORMONE: CPT | Performed by: NURSE PRACTITIONER

## 2017-08-07 PROCEDURE — 85025 COMPLETE CBC W/AUTO DIFF WBC: CPT | Performed by: NURSE PRACTITIONER

## 2017-08-07 PROCEDURE — 99214 OFFICE O/P EST MOD 30 MIN: CPT | Performed by: NURSE PRACTITIONER

## 2017-08-07 PROCEDURE — 80053 COMPREHEN METABOLIC PANEL: CPT | Performed by: NURSE PRACTITIONER

## 2017-08-07 RX ORDER — CETIRIZINE HYDROCHLORIDE 10 MG/1
10 TABLET ORAL DAILY
Qty: 90 TABLET | Refills: 3 | Status: SHIPPED | OUTPATIENT
Start: 2017-08-07 | End: 2017-10-25 | Stop reason: SDUPTHER

## 2017-08-07 RX ORDER — OMEPRAZOLE 40 MG/1
40 CAPSULE, DELAYED RELEASE ORAL DAILY
Qty: 90 CAPSULE | Refills: 2 | Status: SHIPPED | OUTPATIENT
Start: 2017-08-07 | End: 2017-11-03 | Stop reason: SDUPTHER

## 2017-08-07 RX ORDER — ARIPIPRAZOLE 5 MG/1
5 TABLET ORAL DAILY
Qty: 30 TABLET | Refills: 5 | Status: SHIPPED | OUTPATIENT
Start: 2017-08-07 | End: 2017-08-07 | Stop reason: SDUPTHER

## 2017-08-07 RX ORDER — ZOLPIDEM TARTRATE 10 MG/1
10 TABLET ORAL NIGHTLY PRN
Qty: 30 TABLET | Refills: 2 | Status: SHIPPED | OUTPATIENT
Start: 2017-08-07 | End: 2017-08-28

## 2017-08-07 RX ORDER — PREGABALIN 100 MG/1
100 CAPSULE ORAL 2 TIMES DAILY
Qty: 60 CAPSULE | Refills: 1 | Status: SHIPPED | OUTPATIENT
Start: 2017-08-07 | End: 2017-08-28

## 2017-08-07 RX ORDER — GABAPENTIN 400 MG/1
400 CAPSULE ORAL 3 TIMES DAILY
Qty: 90 CAPSULE | Refills: 1 | Status: SHIPPED | OUTPATIENT
Start: 2017-08-07 | End: 2017-08-28

## 2017-08-07 RX ORDER — ARIPIPRAZOLE 5 MG/1
5 TABLET ORAL DAILY
Qty: 90 TABLET | Refills: 3 | Status: SHIPPED | OUTPATIENT
Start: 2017-08-07 | End: 2017-11-03

## 2017-08-07 RX ORDER — CLONAZEPAM 1 MG/1
TABLET ORAL
Qty: 90 TABLET | Refills: 0 | Status: SHIPPED | OUTPATIENT
Start: 2017-08-07 | End: 2017-08-28 | Stop reason: SDUPTHER
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
## 2017-08-08 DIAGNOSIS — R32 URINARY INCONTINENCE, UNSPECIFIED TYPE: Primary | ICD-10-CM

## 2017-08-08 LAB
T4 SERPL-MCNC: 8.9 MCG/DL (ref 5.5–11)
TSH SERPL DL<=0.05 MIU/L-ACNC: 0.92 MIU/ML (ref 0.46–4.68)
VIT B12 BLD-MCNC: 424 PG/ML (ref 239–931)

## 2017-08-28 ENCOUNTER — OFFICE VISIT (OUTPATIENT)
Dept: FAMILY MEDICINE CLINIC | Facility: CLINIC | Age: 52
End: 2017-08-28

## 2017-08-28 VITALS
DIASTOLIC BLOOD PRESSURE: 84 MMHG | WEIGHT: 286 LBS | TEMPERATURE: 96.1 F | SYSTOLIC BLOOD PRESSURE: 122 MMHG | HEIGHT: 72 IN | BODY MASS INDEX: 38.74 KG/M2

## 2017-08-28 DIAGNOSIS — M54.17 LUMBOSACRAL NEURITIS: ICD-10-CM

## 2017-08-28 DIAGNOSIS — G47.00 INSOMNIA, UNSPECIFIED TYPE: ICD-10-CM

## 2017-08-28 DIAGNOSIS — F41.9 ANXIETY: ICD-10-CM

## 2017-08-28 DIAGNOSIS — M25.50 MULTIPLE JOINT PAIN: ICD-10-CM

## 2017-08-28 DIAGNOSIS — M54.9 SPINAL PAIN: Primary | ICD-10-CM

## 2017-08-28 PROCEDURE — 99214 OFFICE O/P EST MOD 30 MIN: CPT | Performed by: NURSE PRACTITIONER

## 2017-08-28 RX ORDER — MELOXICAM 15 MG/1
15 TABLET ORAL DAILY
COMMUNITY
End: 2017-11-03 | Stop reason: SDUPTHER

## 2017-08-28 RX ORDER — HYDROCODONE BITARTRATE AND ACETAMINOPHEN 10; 325 MG/1; MG/1
TABLET ORAL
Qty: 120 TABLET | Refills: 0 | Status: SHIPPED | OUTPATIENT
Start: 2017-08-28 | End: 2017-11-03 | Stop reason: SDUPTHER

## 2017-08-28 RX ORDER — AMITRIPTYLINE HYDROCHLORIDE 50 MG/1
1 TABLET, FILM COATED ORAL
Refills: 3 | COMMUNITY
Start: 2017-08-03 | End: 2017-09-08

## 2017-08-28 RX ORDER — BUTALBITAL, ACETAMINOPHEN AND CAFFEINE 50; 325; 40 MG/1; MG/1; MG/1
1 TABLET ORAL EVERY 4 HOURS PRN
COMMUNITY
End: 2022-11-25

## 2017-08-28 RX ORDER — MULTIVIT WITH MINERALS/LUTEIN
1000 TABLET ORAL DAILY
COMMUNITY

## 2017-08-28 RX ORDER — PREGABALIN 150 MG/1
150 CAPSULE ORAL 3 TIMES DAILY
Qty: 90 CAPSULE | Refills: 2 | Status: SHIPPED | OUTPATIENT
Start: 2017-08-28 | End: 2017-09-08 | Stop reason: SDUPTHER

## 2017-08-28 RX ORDER — MIRTAZAPINE 30 MG/1
30 TABLET, FILM COATED ORAL NIGHTLY
Qty: 30 TABLET | Refills: 2 | Status: SHIPPED | OUTPATIENT
Start: 2017-08-28 | End: 2017-09-08 | Stop reason: SDUPTHER

## 2017-08-28 RX ORDER — CLONAZEPAM 1 MG/1
TABLET ORAL
Qty: 90 TABLET | Refills: 0 | Status: SHIPPED | OUTPATIENT
Start: 2017-08-28 | End: 2017-11-20 | Stop reason: SDUPTHER

## 2017-08-29 RX ORDER — FLUCONAZOLE 150 MG/1
150 TABLET ORAL ONCE
Qty: 2 TABLET | Refills: 0 | Status: SHIPPED | OUTPATIENT
Start: 2017-08-29 | End: 2017-08-29

## 2017-08-31 ENCOUNTER — APPOINTMENT (OUTPATIENT)
Dept: PHYSICAL THERAPY | Facility: HOSPITAL | Age: 52
End: 2017-08-31

## 2017-09-08 DIAGNOSIS — M54.9 SPINAL PAIN: ICD-10-CM

## 2017-09-08 DIAGNOSIS — G47.00 INSOMNIA, UNSPECIFIED TYPE: ICD-10-CM

## 2017-09-08 RX ORDER — MIRTAZAPINE 30 MG/1
30 TABLET, FILM COATED ORAL NIGHTLY
Qty: 90 TABLET | Refills: 1 | Status: SHIPPED | OUTPATIENT
Start: 2017-09-08 | End: 2017-10-25

## 2017-09-08 RX ORDER — PREGABALIN 150 MG/1
150 CAPSULE ORAL 3 TIMES DAILY
Qty: 270 CAPSULE | Refills: 1 | Status: SHIPPED | OUTPATIENT
Start: 2017-09-08 | End: 2017-11-03 | Stop reason: SDUPTHER

## 2017-09-12 DIAGNOSIS — M25.561 RIGHT KNEE PAIN, UNSPECIFIED CHRONICITY: Primary | ICD-10-CM

## 2017-10-17 RX ORDER — ZOLPIDEM TARTRATE 10 MG/1
TABLET ORAL
Qty: 30 TABLET | Refills: 0 | OUTPATIENT
Start: 2017-10-17

## 2017-10-19 DIAGNOSIS — Z00.8 EVALUATION BY PSYCHIATRIC SERVICE REQUIRED: Primary | ICD-10-CM

## 2017-10-23 ENCOUNTER — PREP FOR SURGERY (OUTPATIENT)
Dept: OTHER | Facility: HOSPITAL | Age: 52
End: 2017-10-23

## 2017-10-23 DIAGNOSIS — R35.0 URINARY FREQUENCY: Primary | ICD-10-CM

## 2017-10-23 RX ORDER — LEVOFLOXACIN 5 MG/ML
500 INJECTION, SOLUTION INTRAVENOUS ONCE
Status: CANCELLED | OUTPATIENT
Start: 2017-10-27

## 2017-10-25 ENCOUNTER — APPOINTMENT (OUTPATIENT)
Dept: PREADMISSION TESTING | Facility: HOSPITAL | Age: 52
End: 2017-10-25

## 2017-10-25 VITALS
HEART RATE: 79 BPM | OXYGEN SATURATION: 96 % | BODY MASS INDEX: 39.48 KG/M2 | WEIGHT: 282 LBS | HEIGHT: 71 IN | SYSTOLIC BLOOD PRESSURE: 114 MMHG | DIASTOLIC BLOOD PRESSURE: 82 MMHG | RESPIRATION RATE: 16 BRPM

## 2017-10-25 DIAGNOSIS — R35.0 URINARY FREQUENCY: ICD-10-CM

## 2017-10-25 LAB
BILIRUB UR QL STRIP: NEGATIVE
CLARITY UR: ABNORMAL
COLOR UR: YELLOW
GLUCOSE UR STRIP-MCNC: NEGATIVE MG/DL
HGB UR QL STRIP.AUTO: NEGATIVE
KETONES UR QL STRIP: NEGATIVE
LEUKOCYTE ESTERASE UR QL STRIP.AUTO: ABNORMAL
NITRITE UR QL STRIP: POSITIVE
PH UR STRIP.AUTO: 7 [PH] (ref 5–9)
PROT UR QL STRIP: NEGATIVE
SP GR UR STRIP: 1.01 (ref 1–1.03)
UROBILINOGEN UR QL STRIP: ABNORMAL

## 2017-10-25 RX ORDER — TOLTERODINE 4 MG/1
4 CAPSULE, EXTENDED RELEASE ORAL NIGHTLY
COMMUNITY
End: 2017-11-03

## 2017-10-25 RX ORDER — ZOLPIDEM TARTRATE 10 MG/1
10 TABLET ORAL NIGHTLY PRN
COMMUNITY
End: 2017-11-03

## 2017-10-25 RX ORDER — FLUTICASONE PROPIONATE 50 MCG
1 SPRAY, SUSPENSION (ML) NASAL 2 TIMES DAILY
COMMUNITY
End: 2017-11-03 | Stop reason: SDUPTHER

## 2017-10-25 RX ORDER — CETIRIZINE HYDROCHLORIDE 10 MG/1
10 TABLET ORAL NIGHTLY
COMMUNITY
End: 2017-11-03 | Stop reason: SDUPTHER

## 2017-10-25 RX ORDER — BACLOFEN 20 MG/1
20 TABLET ORAL 4 TIMES DAILY
COMMUNITY
End: 2017-11-03 | Stop reason: SDUPTHER

## 2017-10-25 RX ORDER — SODIUM CHLORIDE, SODIUM GLUCONATE, SODIUM ACETATE, POTASSIUM CHLORIDE, AND MAGNESIUM CHLORIDE 526; 502; 368; 37; 30 MG/100ML; MG/100ML; MG/100ML; MG/100ML; MG/100ML
1000 INJECTION, SOLUTION INTRAVENOUS CONTINUOUS PRN
Status: CANCELLED | OUTPATIENT
Start: 2017-10-27

## 2017-10-25 RX ORDER — VIT A/VIT C/VIT E/ZINC/COPPER 4296-226
1 CAPSULE ORAL DAILY
COMMUNITY

## 2017-10-25 RX ORDER — AMITRIPTYLINE HYDROCHLORIDE 50 MG/1
50 TABLET, FILM COATED ORAL NIGHTLY
COMMUNITY
End: 2017-11-03

## 2017-10-26 DIAGNOSIS — R32 URINARY INCONTINENCE, UNSPECIFIED TYPE: ICD-10-CM

## 2017-10-26 DIAGNOSIS — J40 BRONCHITIS: ICD-10-CM

## 2017-10-26 RX ORDER — MONTELUKAST SODIUM 10 MG/1
10 TABLET ORAL NIGHTLY
Qty: 90 TABLET | Refills: 1 | Status: SHIPPED | OUTPATIENT
Start: 2017-10-26 | End: 2017-11-03 | Stop reason: SDUPTHER

## 2017-10-26 RX ORDER — OXYBUTYNIN CHLORIDE 10 MG/1
10 TABLET, EXTENDED RELEASE ORAL DAILY
Qty: 90 TABLET | Refills: 1 | Status: SHIPPED | OUTPATIENT
Start: 2017-10-26 | End: 2017-11-03

## 2017-10-27 ENCOUNTER — APPOINTMENT (OUTPATIENT)
Dept: GENERAL RADIOLOGY | Facility: HOSPITAL | Age: 52
End: 2017-10-27

## 2017-10-27 ENCOUNTER — ANESTHESIA (OUTPATIENT)
Dept: PERIOP | Facility: HOSPITAL | Age: 52
End: 2017-10-27

## 2017-10-27 ENCOUNTER — ANESTHESIA EVENT (OUTPATIENT)
Dept: PERIOP | Facility: HOSPITAL | Age: 52
End: 2017-10-27

## 2017-10-27 ENCOUNTER — HOSPITAL ENCOUNTER (OUTPATIENT)
Facility: HOSPITAL | Age: 52
Setting detail: HOSPITAL OUTPATIENT SURGERY
Discharge: HOME | End: 2017-10-27
Attending: UROLOGY | Admitting: UROLOGY

## 2017-10-27 VITALS
SYSTOLIC BLOOD PRESSURE: 105 MMHG | HEART RATE: 77 BPM | TEMPERATURE: 98.4 F | RESPIRATION RATE: 18 BRPM | OXYGEN SATURATION: 100 % | DIASTOLIC BLOOD PRESSURE: 58 MMHG

## 2017-10-27 DIAGNOSIS — R35.0 URINARY FREQUENCY: ICD-10-CM

## 2017-10-27 PROCEDURE — 76000 FLUOROSCOPY <1 HR PHYS/QHP: CPT

## 2017-10-27 PROCEDURE — C1894 INTRO/SHEATH, NON-LASER: HCPCS | Performed by: UROLOGY

## 2017-10-27 PROCEDURE — 25010000002 MIDAZOLAM PER 1 MG: Performed by: NURSE ANESTHETIST, CERTIFIED REGISTERED

## 2017-10-27 PROCEDURE — 25010000002 FENTANYL CITRATE (PF) 100 MCG/2ML SOLUTION: Performed by: NURSE ANESTHETIST, CERTIFIED REGISTERED

## 2017-10-27 PROCEDURE — 25010000002 ONDANSETRON PER 1 MG: Performed by: NURSE ANESTHETIST, CERTIFIED REGISTERED

## 2017-10-27 PROCEDURE — 25010000002 PROPOFOL 10 MG/ML EMULSION: Performed by: NURSE ANESTHETIST, CERTIFIED REGISTERED

## 2017-10-27 PROCEDURE — 25010000002 LEVOFLOXACIN PER 250 MG: Performed by: UROLOGY

## 2017-10-27 PROCEDURE — C1778 LEAD, NEUROSTIMULATOR: HCPCS | Performed by: UROLOGY

## 2017-10-27 PROCEDURE — C1767 GENERATOR, NEURO NON-RECHARG: HCPCS | Performed by: UROLOGY

## 2017-10-27 DEVICE — NEUROSTIMULAR INTERSTIM 2: Type: IMPLANTABLE DEVICE | Site: BACK | Status: FUNCTIONAL

## 2017-10-27 DEVICE — LD INTERSTIM TINED 28CM 388928: Type: IMPLANTABLE DEVICE | Site: BACK | Status: FUNCTIONAL

## 2017-10-27 RX ORDER — OXYCODONE AND ACETAMINOPHEN 7.5; 325 MG/1; MG/1
1-2 TABLET ORAL EVERY 4 HOURS PRN
Qty: 15 TABLET | Refills: 0 | Status: SHIPPED | OUTPATIENT
Start: 2017-10-27 | End: 2017-11-03

## 2017-10-27 RX ORDER — PROPOFOL 10 MG/ML
VIAL (ML) INTRAVENOUS AS NEEDED
Status: DISCONTINUED | OUTPATIENT
Start: 2017-10-27 | End: 2017-10-27 | Stop reason: SURG

## 2017-10-27 RX ORDER — LIDOCAINE HYDROCHLORIDE 10 MG/ML
INJECTION, SOLUTION INFILTRATION; PERINEURAL AS NEEDED
Status: DISCONTINUED | OUTPATIENT
Start: 2017-10-27 | End: 2017-10-27 | Stop reason: HOSPADM

## 2017-10-27 RX ORDER — ONDANSETRON 2 MG/ML
INJECTION INTRAMUSCULAR; INTRAVENOUS AS NEEDED
Status: DISCONTINUED | OUTPATIENT
Start: 2017-10-27 | End: 2017-10-27 | Stop reason: SURG

## 2017-10-27 RX ORDER — LEVOFLOXACIN 5 MG/ML
500 INJECTION, SOLUTION INTRAVENOUS ONCE
Status: COMPLETED | OUTPATIENT
Start: 2017-10-27 | End: 2017-10-27

## 2017-10-27 RX ORDER — SODIUM CHLORIDE, SODIUM GLUCONATE, SODIUM ACETATE, POTASSIUM CHLORIDE, AND MAGNESIUM CHLORIDE 526; 502; 368; 37; 30 MG/100ML; MG/100ML; MG/100ML; MG/100ML; MG/100ML
1000 INJECTION, SOLUTION INTRAVENOUS CONTINUOUS PRN
Status: DISCONTINUED | OUTPATIENT
Start: 2017-10-27 | End: 2017-10-27 | Stop reason: HOSPADM

## 2017-10-27 RX ORDER — MIDAZOLAM HYDROCHLORIDE 1 MG/ML
INJECTION INTRAMUSCULAR; INTRAVENOUS AS NEEDED
Status: DISCONTINUED | OUTPATIENT
Start: 2017-10-27 | End: 2017-10-27 | Stop reason: SURG

## 2017-10-27 RX ORDER — FENTANYL CITRATE 50 UG/ML
INJECTION, SOLUTION INTRAMUSCULAR; INTRAVENOUS AS NEEDED
Status: DISCONTINUED | OUTPATIENT
Start: 2017-10-27 | End: 2017-10-27 | Stop reason: SURG

## 2017-10-27 RX ORDER — DOXYCYCLINE HYCLATE 100 MG/1
100 CAPSULE ORAL DAILY
Qty: 7 CAPSULE | Refills: 0 | Status: SHIPPED | OUTPATIENT
Start: 2017-10-27 | End: 2017-11-03

## 2017-10-27 RX ORDER — BACITRACIN 50000 [IU]/1
INJECTION, POWDER, FOR SOLUTION INTRAMUSCULAR AS NEEDED
Status: DISCONTINUED | OUTPATIENT
Start: 2017-10-27 | End: 2017-10-27 | Stop reason: HOSPADM

## 2017-10-27 RX ORDER — ALBUTEROL SULFATE 90 UG/1
2 AEROSOL, METERED RESPIRATORY (INHALATION) EVERY 4 HOURS PRN
COMMUNITY

## 2017-10-27 RX ORDER — WATER 1000 ML/1000ML
INJECTION, SOLUTION INTRAVENOUS AS NEEDED
Status: DISCONTINUED | OUTPATIENT
Start: 2017-10-27 | End: 2017-10-27 | Stop reason: HOSPADM

## 2017-10-27 RX ORDER — BUPIVACAINE HYDROCHLORIDE 5 MG/ML
INJECTION, SOLUTION EPIDURAL; INTRACAUDAL AS NEEDED
Status: DISCONTINUED | OUTPATIENT
Start: 2017-10-27 | End: 2017-10-27 | Stop reason: HOSPADM

## 2017-10-27 RX ADMIN — SODIUM CHLORIDE, SODIUM GLUCONATE, SODIUM ACETATE, POTASSIUM CHLORIDE, AND MAGNESIUM CHLORIDE: 526; 502; 368; 37; 30 INJECTION, SOLUTION INTRAVENOUS at 16:32

## 2017-10-27 RX ADMIN — PROPOFOL 20 MG: 10 INJECTION, EMULSION INTRAVENOUS at 16:57

## 2017-10-27 RX ADMIN — PROPOFOL 50 MG: 10 INJECTION, EMULSION INTRAVENOUS at 17:02

## 2017-10-27 RX ADMIN — FENTANYL CITRATE 50 MCG: 50 INJECTION, SOLUTION INTRAMUSCULAR; INTRAVENOUS at 16:33

## 2017-10-27 RX ADMIN — FENTANYL CITRATE 50 MCG: 50 INJECTION, SOLUTION INTRAMUSCULAR; INTRAVENOUS at 16:44

## 2017-10-27 RX ADMIN — PROPOFOL 20 MG: 10 INJECTION, EMULSION INTRAVENOUS at 16:48

## 2017-10-27 RX ADMIN — PROPOFOL 50 MG: 10 INJECTION, EMULSION INTRAVENOUS at 17:14

## 2017-10-27 RX ADMIN — MIDAZOLAM 2 MG: 1 INJECTION INTRAMUSCULAR; INTRAVENOUS at 16:29

## 2017-10-27 RX ADMIN — PROPOFOL 20 MG: 10 INJECTION, EMULSION INTRAVENOUS at 16:38

## 2017-10-27 RX ADMIN — SODIUM CHLORIDE, SODIUM GLUCONATE, SODIUM ACETATE, POTASSIUM CHLORIDE, AND MAGNESIUM CHLORIDE 1000 ML: 526; 502; 368; 37; 30 INJECTION, SOLUTION INTRAVENOUS at 15:06

## 2017-10-27 RX ADMIN — PROPOFOL 40 MG: 10 INJECTION, EMULSION INTRAVENOUS at 17:10

## 2017-10-27 RX ADMIN — PROPOFOL 40 MG: 10 INJECTION, EMULSION INTRAVENOUS at 17:05

## 2017-10-27 RX ADMIN — LEVOFLOXACIN 500 MG: 5 INJECTION, SOLUTION INTRAVENOUS at 16:31

## 2017-10-27 RX ADMIN — PROPOFOL 30 MG: 10 INJECTION, EMULSION INTRAVENOUS at 16:51

## 2017-10-27 RX ADMIN — PROPOFOL 20 MG: 10 INJECTION, EMULSION INTRAVENOUS at 16:43

## 2017-10-27 RX ADMIN — ONDANSETRON 4 MG: 2 INJECTION INTRAMUSCULAR; INTRAVENOUS at 16:58

## 2017-10-27 NOTE — ANESTHESIA PREPROCEDURE EVALUATION
Anesthesia Evaluation     no history of anesthetic complications:  NPO Solid Status: > 8 hours  NPO Liquid Status: > 8 hours     Airway   Mallampati: III  TM distance: <3 FB  Neck ROM: limited  difficult intubation highly probable  Dental - normal exam     Pulmonary - normal exam    breath sounds clear to auscultation  (+) asthma,   Cardiovascular   Exercise tolerance: good (4-7 METS)    Rhythm: regular  Rate: normal    (-) angina, murmur      Neuro/Psych  (+) headaches (daily), psychiatric history Anxiety and Depression,    GI/Hepatic/Renal/Endo    (+) morbid obesity, GERD,     ROS Comment: Bladder incontinence    Musculoskeletal     (+) back pain, chronic pain, neck pain,   Abdominal   (+) obese,    Substance History   (+) alcohol use (socially),      OB/GYN          Other   (+) arthritis (knees)     (-) history of cancer                                  Anesthesia Plan    ASA 3     MAC     intravenous induction   Anesthetic plan and risks discussed with patient.

## 2017-10-28 NOTE — ANESTHESIA POSTPROCEDURE EVALUATION
Patient: Shania Sweeney    Procedure Summary     Date Anesthesia Start Anesthesia Stop Room / Location    10/27/17 1632 1723 [facility] 09 / [facility]       Procedure Diagnosis Surgeon Provider    INTERSTIM STAGES ONE AND TWO   (C-ARM AND C-ARM TABLE) (N/A ) Urinary frequency  (Urinary frequency [R35.0]) MD Ced Tsai MD          Anesthesia Type: MAC  Last vitals  BP   105/58 (10/27/17 1725)   Temp   98.4 °F (36.9 °C) (10/27/17 1725)   Pulse   77 (10/27/17 1725)   Resp   18 (10/27/17 1725)     SpO2   100 % (10/27/17 1725)     Post Anesthesia Care and Evaluation    Patient location during evaluation: PHASE II  Patient participation: complete - patient participated  Level of consciousness: awake and alert  Pain score: 2  Pain management: adequate  Airway patency: patent  Anesthetic complications: No anesthetic complications  PONV Status: none  Cardiovascular status: acceptable  Respiratory status: acceptable  Hydration status: acceptable

## 2017-10-30 RX ORDER — ESTROGENS, CONJUGATED 1.25 MG
TABLET ORAL
Qty: 90 TABLET | Refills: 1 | OUTPATIENT
Start: 2017-10-30

## 2017-11-03 ENCOUNTER — OFFICE VISIT (OUTPATIENT)
Dept: FAMILY MEDICINE CLINIC | Facility: CLINIC | Age: 52
End: 2017-11-03

## 2017-11-03 VITALS
SYSTOLIC BLOOD PRESSURE: 118 MMHG | DIASTOLIC BLOOD PRESSURE: 76 MMHG | HEART RATE: 80 BPM | TEMPERATURE: 97.3 F | HEIGHT: 71 IN

## 2017-11-03 DIAGNOSIS — M15.9 OSTEOARTHRITIS OF MULTIPLE JOINTS, UNSPECIFIED OSTEOARTHRITIS TYPE: ICD-10-CM

## 2017-11-03 DIAGNOSIS — M54.17 LUMBOSACRAL NEURITIS: Primary | ICD-10-CM

## 2017-11-03 DIAGNOSIS — Q79.60 EHLERS-DANLOS SYNDROME: ICD-10-CM

## 2017-11-03 DIAGNOSIS — G24.9 DYSTONIA: ICD-10-CM

## 2017-11-03 DIAGNOSIS — M54.12 BRACHIAL NEURITIS: ICD-10-CM

## 2017-11-03 DIAGNOSIS — G44.209 TENSION HEADACHE: ICD-10-CM

## 2017-11-03 DIAGNOSIS — N90.5 VULVAR ATROPHY: ICD-10-CM

## 2017-11-03 DIAGNOSIS — K21.9 GASTROESOPHAGEAL REFLUX DISEASE, ESOPHAGITIS PRESENCE NOT SPECIFIED: ICD-10-CM

## 2017-11-03 DIAGNOSIS — G47.00 INSOMNIA, UNSPECIFIED TYPE: ICD-10-CM

## 2017-11-03 DIAGNOSIS — F41.9 ANXIETY: ICD-10-CM

## 2017-11-03 DIAGNOSIS — M54.9 SPINAL PAIN: ICD-10-CM

## 2017-11-03 DIAGNOSIS — J40 BRONCHITIS: ICD-10-CM

## 2017-11-03 DIAGNOSIS — M25.50 MULTIPLE JOINT PAIN: ICD-10-CM

## 2017-11-03 PROCEDURE — 99214 OFFICE O/P EST MOD 30 MIN: CPT | Performed by: NURSE PRACTITIONER

## 2017-11-03 RX ORDER — MONTELUKAST SODIUM 10 MG/1
10 TABLET ORAL NIGHTLY
Qty: 90 TABLET | Refills: 3 | Status: SHIPPED | OUTPATIENT
Start: 2017-11-03 | End: 2017-11-03 | Stop reason: SDUPTHER

## 2017-11-03 RX ORDER — OMEPRAZOLE 40 MG/1
40 CAPSULE, DELAYED RELEASE ORAL DAILY
Qty: 90 CAPSULE | Refills: 3 | Status: SHIPPED | OUTPATIENT
Start: 2017-11-03 | End: 2022-08-10

## 2017-11-03 RX ORDER — HYDROCODONE BITARTRATE AND ACETAMINOPHEN 10; 325 MG/1; MG/1
TABLET ORAL
Qty: 120 TABLET | Refills: 0 | Status: SHIPPED | OUTPATIENT
Start: 2017-11-03 | End: 2018-03-09 | Stop reason: SDUPTHER

## 2017-11-03 RX ORDER — PREGABALIN 150 MG/1
150 CAPSULE ORAL 3 TIMES DAILY
Qty: 270 CAPSULE | Refills: 1 | Status: SHIPPED | OUTPATIENT
Start: 2017-11-03 | End: 2018-03-09 | Stop reason: ALTCHOICE

## 2017-11-03 RX ORDER — FLUTICASONE PROPIONATE 50 MCG
1 SPRAY, SUSPENSION (ML) NASAL 2 TIMES DAILY
Qty: 3 BOTTLE | Refills: 3 | Status: SHIPPED | OUTPATIENT
Start: 2017-11-03 | End: 2022-08-10

## 2017-11-03 RX ORDER — QUETIAPINE FUMARATE 50 MG/1
50 TABLET, FILM COATED ORAL NIGHTLY
Qty: 14 TABLET | Refills: 0 | Status: SHIPPED | OUTPATIENT
Start: 2017-11-03 | End: 2018-02-13 | Stop reason: SDUPTHER

## 2017-11-03 RX ORDER — QUETIAPINE FUMARATE 50 MG/1
50 TABLET, FILM COATED ORAL NIGHTLY
Qty: 90 TABLET | Refills: 3 | Status: SHIPPED | OUTPATIENT
Start: 2017-11-03 | End: 2017-11-03 | Stop reason: SDUPTHER

## 2017-11-03 RX ORDER — BACLOFEN 20 MG/1
20 TABLET ORAL 4 TIMES DAILY
Qty: 120 TABLET | Refills: 3 | Status: SHIPPED | OUTPATIENT
Start: 2017-11-03 | End: 2018-02-27 | Stop reason: SDUPTHER

## 2017-11-03 RX ORDER — CETIRIZINE HYDROCHLORIDE 10 MG/1
10 TABLET ORAL NIGHTLY
Qty: 90 TABLET | Refills: 3 | Status: SHIPPED | OUTPATIENT
Start: 2017-11-03 | End: 2017-11-20 | Stop reason: SDUPTHER

## 2017-11-03 RX ORDER — MONTELUKAST SODIUM 10 MG/1
10 TABLET ORAL NIGHTLY
Qty: 90 TABLET | Refills: 3 | OUTPATIENT
Start: 2017-11-03 | End: 2022-11-25

## 2017-11-03 RX ORDER — TEMAZEPAM 30 MG/1
30 CAPSULE ORAL NIGHTLY PRN
Qty: 30 CAPSULE | Refills: 0 | Status: SHIPPED | OUTPATIENT
Start: 2017-11-03 | End: 2017-12-05 | Stop reason: SDUPTHER

## 2017-11-03 RX ORDER — MELOXICAM 15 MG/1
15 TABLET ORAL DAILY
Qty: 90 TABLET | Refills: 3 | Status: SHIPPED | OUTPATIENT
Start: 2017-11-03
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
## 2017-11-20 DIAGNOSIS — F41.9 ANXIETY: ICD-10-CM

## 2017-11-20 RX ORDER — CETIRIZINE HYDROCHLORIDE 10 MG/1
TABLET ORAL
Qty: 30 TABLET | Refills: 0 | Status: SHIPPED | OUTPATIENT
Start: 2017-11-20 | End: 2022-08-10

## 2017-11-21 RX ORDER — CLONAZEPAM 1 MG/1
TABLET ORAL
Qty: 90 TABLET | Refills: 0 | Status: SHIPPED | OUTPATIENT
Start: 2017-11-21 | End: 2017-12-05 | Stop reason: SDUPTHER

## 2017-12-05 DIAGNOSIS — F41.9 ANXIETY: ICD-10-CM

## 2017-12-05 DIAGNOSIS — G47.00 INSOMNIA, UNSPECIFIED TYPE: ICD-10-CM

## 2017-12-05 RX ORDER — CLONAZEPAM 1 MG/1
TABLET ORAL
Qty: 90 TABLET | Refills: 0 | Status: SHIPPED | OUTPATIENT
Start: 2017-12-05 | End: 2017-12-07 | Stop reason: SDUPTHER

## 2017-12-05 RX ORDER — TEMAZEPAM 30 MG/1
30 CAPSULE ORAL NIGHTLY PRN
Qty: 30 CAPSULE | Refills: 0 | Status: SHIPPED | OUTPATIENT
Start: 2017-12-05 | End: 2017-12-07 | Stop reason: SDUPTHER

## 2017-12-07 DIAGNOSIS — F41.9 ANXIETY: ICD-10-CM

## 2017-12-07 DIAGNOSIS — G47.00 INSOMNIA, UNSPECIFIED TYPE: ICD-10-CM

## 2017-12-07 RX ORDER — TEMAZEPAM 30 MG/1
30 CAPSULE ORAL NIGHTLY PRN
Qty: 30 CAPSULE | Refills: 0 | Status: SHIPPED | OUTPATIENT
Start: 2017-12-07 | End: 2018-01-11 | Stop reason: SDUPTHER

## 2017-12-07 RX ORDER — CLONAZEPAM 1 MG/1
1 TABLET ORAL 3 TIMES DAILY PRN
Qty: 90 TABLET | Refills: 0 | Status: SHIPPED | OUTPATIENT
Start: 2017-12-07 | End: 2018-01-11 | Stop reason: SDUPTHER

## 2018-01-11 DIAGNOSIS — F41.9 ANXIETY: ICD-10-CM

## 2018-01-11 DIAGNOSIS — G47.00 INSOMNIA, UNSPECIFIED TYPE: ICD-10-CM

## 2018-01-11 RX ORDER — TEMAZEPAM 30 MG/1
30 CAPSULE ORAL NIGHTLY PRN
Qty: 30 CAPSULE | Refills: 0 | Status: SHIPPED | OUTPATIENT
Start: 2018-01-11 | End: 2018-02-13 | Stop reason: SDUPTHER

## 2018-01-11 RX ORDER — CLONAZEPAM 1 MG/1
1 TABLET ORAL 3 TIMES DAILY PRN
Qty: 90 TABLET | Refills: 0 | Status: SHIPPED | OUTPATIENT
Start: 2018-01-11 | End: 2018-02-15 | Stop reason: SDUPTHER

## 2018-02-13 ENCOUNTER — OFFICE VISIT (OUTPATIENT)
Dept: FAMILY MEDICINE CLINIC | Facility: CLINIC | Age: 53
End: 2018-02-13

## 2018-02-13 VITALS
HEIGHT: 71 IN | BODY MASS INDEX: 39.2 KG/M2 | SYSTOLIC BLOOD PRESSURE: 102 MMHG | TEMPERATURE: 97.9 F | WEIGHT: 280 LBS | HEART RATE: 74 BPM | DIASTOLIC BLOOD PRESSURE: 64 MMHG

## 2018-02-13 DIAGNOSIS — G47.00 INSOMNIA, UNSPECIFIED TYPE: ICD-10-CM

## 2018-02-13 DIAGNOSIS — R05.9 COUGH: Primary | ICD-10-CM

## 2018-02-13 DIAGNOSIS — F32.A DEPRESSION, UNSPECIFIED DEPRESSION TYPE: ICD-10-CM

## 2018-02-13 DIAGNOSIS — J06.9 UPPER RESPIRATORY TRACT INFECTION, UNSPECIFIED TYPE: ICD-10-CM

## 2018-02-13 LAB
FLUAV AG NPH QL: POSITIVE
FLUBV AG NPH QL IA: NEGATIVE

## 2018-02-13 PROCEDURE — 87804 INFLUENZA ASSAY W/OPTIC: CPT | Performed by: NURSE PRACTITIONER

## 2018-02-13 PROCEDURE — 99214 OFFICE O/P EST MOD 30 MIN: CPT | Performed by: NURSE PRACTITIONER

## 2018-02-13 RX ORDER — TEMAZEPAM 30 MG/1
30 CAPSULE ORAL NIGHTLY PRN
Qty: 30 CAPSULE | Refills: 0 | Status: SHIPPED | OUTPATIENT
Start: 2018-02-13 | End: 2018-03-09 | Stop reason: SDUPTHER

## 2018-02-13 RX ORDER — QUETIAPINE FUMARATE 50 MG/1
50 TABLET, FILM COATED ORAL NIGHTLY
Qty: 30 TABLET | Refills: 5 | Status: SHIPPED | OUTPATIENT
Start: 2018-02-13 | End: 2022-08-10

## 2018-02-13 RX ORDER — AZITHROMYCIN 250 MG/1
TABLET, FILM COATED ORAL
Qty: 6 TABLET | Refills: 0 | Status: SHIPPED | OUTPATIENT
Start: 2018-02-13 | End: 2022-08-10

## 2018-02-13 RX ORDER — OSELTAMIVIR PHOSPHATE 75 MG/1
75 CAPSULE ORAL 2 TIMES DAILY
Qty: 10 CAPSULE | Refills: 0 | Status: SHIPPED | OUTPATIENT
Start: 2018-02-13 | End: 2022-08-10

## 2018-02-15 DIAGNOSIS — Z01.00 DIABETIC EYE EXAM (HCC): Primary | ICD-10-CM

## 2018-02-15 DIAGNOSIS — E11.9 DIABETIC EYE EXAM (HCC): Primary | ICD-10-CM

## 2018-02-15 DIAGNOSIS — F41.9 ANXIETY: ICD-10-CM

## 2018-02-15 RX ORDER — CLONAZEPAM 1 MG/1
1 TABLET ORAL 3 TIMES DAILY PRN
Qty: 90 TABLET | Refills: 0 | Status: SHIPPED | OUTPATIENT
Start: 2018-02-15 | End: 2018-03-09 | Stop reason: SDUPTHER

## 2018-02-27 DIAGNOSIS — G24.9 DYSTONIA: ICD-10-CM

## 2018-02-27 DIAGNOSIS — G44.209 TENSION HEADACHE: ICD-10-CM

## 2018-02-27 RX ORDER — BACLOFEN 20 MG/1
20 TABLET ORAL 4 TIMES DAILY
Qty: 360 TABLET | Refills: 1 | OUTPATIENT
Start: 2018-02-27 | End: 2022-11-25

## 2018-03-08 DIAGNOSIS — F41.9 ANXIETY: ICD-10-CM

## 2018-03-08 DIAGNOSIS — M54.9 SPINAL PAIN: ICD-10-CM

## 2018-03-08 RX ORDER — CLONAZEPAM 1 MG/1
1 TABLET ORAL 3 TIMES DAILY PRN
Qty: 90 TABLET | Refills: 0 | Status: CANCELLED | OUTPATIENT
Start: 2018-03-08

## 2018-03-09 DIAGNOSIS — M54.17 LUMBOSACRAL NEURITIS: ICD-10-CM

## 2018-03-09 DIAGNOSIS — M25.50 MULTIPLE JOINT PAIN: ICD-10-CM

## 2018-03-09 DIAGNOSIS — G44.209 TENSION HEADACHE: ICD-10-CM

## 2018-03-09 DIAGNOSIS — G24.9 DYSTONIA: ICD-10-CM

## 2018-03-09 DIAGNOSIS — G47.00 INSOMNIA, UNSPECIFIED TYPE: ICD-10-CM

## 2018-03-09 DIAGNOSIS — F41.9 ANXIETY: ICD-10-CM

## 2018-03-09 RX ORDER — HYDROCODONE BITARTRATE AND ACETAMINOPHEN 10; 325 MG/1; MG/1
TABLET ORAL
Qty: 120 TABLET | Refills: 0 | Status: SHIPPED | OUTPATIENT
Start: 2018-03-09

## 2018-03-09 RX ORDER — GABAPENTIN 800 MG/1
800 TABLET ORAL 3 TIMES DAILY
Qty: 270 TABLET | Refills: 0 | Status: SHIPPED | OUTPATIENT
Start: 2018-03-09

## 2018-03-09 RX ORDER — CLONAZEPAM 1 MG/1
1 TABLET ORAL 3 TIMES DAILY PRN
Qty: 90 TABLET | Refills: 0 | Status: SHIPPED | OUTPATIENT
Start: 2018-03-09 | End: 2022-08-10

## 2018-03-09 RX ORDER — PREGABALIN 150 MG/1
150 CAPSULE ORAL 3 TIMES DAILY
Qty: 270 CAPSULE | Refills: 1 | Status: SHIPPED | OUTPATIENT
Start: 2018-03-09 | End: 2018-03-09

## 2018-03-09 RX ORDER — TEMAZEPAM 30 MG/1
30 CAPSULE ORAL NIGHTLY PRN
Qty: 30 CAPSULE | Refills: 0 | Status: SHIPPED | OUTPATIENT
Start: 2018-03-09

## 2018-04-02 DIAGNOSIS — G47.00 INSOMNIA, UNSPECIFIED TYPE: ICD-10-CM

## 2018-04-02 RX ORDER — TEMAZEPAM 30 MG/1
30 CAPSULE ORAL NIGHTLY PRN
Qty: 30 CAPSULE | Refills: 0 | Status: CANCELLED | OUTPATIENT
Start: 2018-04-02

## 2018-04-11 ENCOUNTER — TRANSCRIBE ORDERS (OUTPATIENT)
Dept: PHYSICAL THERAPY | Facility: HOSPITAL | Age: 53
End: 2018-04-11

## 2018-04-11 DIAGNOSIS — M54.5 CHRONIC LOW BACK PAIN, UNSPECIFIED BACK PAIN LATERALITY, WITH SCIATICA PRESENCE UNSPECIFIED: Primary | ICD-10-CM

## 2018-04-11 DIAGNOSIS — G89.29 CHRONIC LOW BACK PAIN, UNSPECIFIED BACK PAIN LATERALITY, WITH SCIATICA PRESENCE UNSPECIFIED: Primary | ICD-10-CM

## 2018-04-17 ENCOUNTER — APPOINTMENT (OUTPATIENT)
Dept: PHYSICAL THERAPY | Facility: HOSPITAL | Age: 53
End: 2018-04-17

## 2018-04-18 ENCOUNTER — HOSPITAL ENCOUNTER (OUTPATIENT)
Dept: PHYSICAL THERAPY | Facility: HOSPITAL | Age: 53
Setting detail: THERAPIES SERIES
Discharge: HOME | End: 2018-04-18

## 2018-04-18 DIAGNOSIS — M54.5 CHRONIC LOW BACK PAIN, UNSPECIFIED BACK PAIN LATERALITY, WITH SCIATICA PRESENCE UNSPECIFIED: Primary | ICD-10-CM

## 2018-04-18 DIAGNOSIS — G89.29 CHRONIC LOW BACK PAIN, UNSPECIFIED BACK PAIN LATERALITY, WITH SCIATICA PRESENCE UNSPECIFIED: Primary | ICD-10-CM

## 2018-04-18 PROCEDURE — 97161 PT EVAL LOW COMPLEX 20 MIN: CPT

## 2018-04-18 NOTE — THERAPY EVALUATION
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   • Anxiety   • Neck pain   • Brachial neuritis   • Bronchitis   • Urinary frequency        Past Medical History:   Diagnosis Date   • Abnormal Pap smear of cervix    • Acute bronchitis     Improving   • Anxiety    • Asthma    • 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    • Chronic back pain     C spine surgery   • Chronic headache disorder    • Cough    • Degenerative joint disease involving multiple joints    • Depression    • Rossana-Danlos syndrome    • GERD (gastroesophageal reflux disease)    • Hip pain    • Hyperlipidemia     Unspecified   • Incontinence    • Insomnia    • Lumbosacral neuritis    • Multiple joint pain    • Neck pain    • Need for prophylactic vaccination and inoculation against influenza    • Obesity    • Pain in joint involving shoulder region     Left   • Rash and nonspecific skin eruption     Rash and other nonspecific skin eruption      • Spasm of back muscles    • Spasm of muscle         Past Surgical History:   Procedure Laterality Date   • APPENDECTOMY     • BARIATRIC SURGERY     • CERVICAL SPINE SURGERY N/A    • CHOLECYSTECTOMY     • EPIDURAL LEAD INSERTION N/A 10/27/2017    Procedure: INTERSTIM STAGES ONE AND TWO   (C-ARM AND C-ARM TABLE);  Surgeon: Farshad Metzger MD;  Location: [facility];  Service:    • FACIAL RECONSTRUCTION SURGERY N/A    •  HYSTERECTOMY  2006    with BSO   • INJECTION OF MEDICATION  09/24/2015    Depo Medrol (Methylprednisone) 80mg    • INJECTION OF MEDICATION  03/19/2015    Toradol   • LUMBAR SPINE SURGERY N/A    • PROCEDURE GENERIC CONVERTED  05/24/2013    Nebulizer Treatment   • RECONSTRUCTION OF NOSE N/A    • SHOULDER ROTATOR CUFF REPAIR Right     x 2   • TONSILLECTOMY     • TRIGGER POINT INJECTION  03/02/2016    [place] Pt Inj, sing/multi pts. 3/+ muscles      Allergies   Allergen Reactions   • Ceftin [Cefuroxime Axetil] Rash   • Cefuroxime Rash   • Sulfa Antibiotics Rash       Current Outpatient Prescriptions:   •  albuterol (PROVENTIL HFA;VENTOLIN HFA) 108 (90 Base) MCG/ACT inhaler, Inhale 2 puffs Every 4 (Four) Hours As Needed for Wheezing., Disp: , Rfl:   •  albuterol (PROVENTIL) (2.5 MG/3ML) 0.083% nebulizer solution, Take 2.5 mg by nebulization Every 4 (Four) Hours As Needed for Wheezing., Disp: 25 vial, Rfl: 3  •  ascorbic acid (VITAMIN C) 1000 MG tablet, Take 1,000 mg by mouth Daily., Disp: , Rfl:   •  azithromycin (ZITHROMAX) 250 MG tablet, Take 2 tablets the first day, then 1 tablet daily for 4 days., Disp: 6 tablet, Rfl: 0  •  baclofen (LIORESAL) 20 MG tablet, Take 1 tablet by mouth 4 (Four) Times a Day., Disp: 360 tablet, Rfl: 1  •  butalbital-acetaminophen-caffeine (FIORICET, ESGIC) -40 MG per tablet, Take 1 tablet by mouth Every 4 (Four) Hours As Needed for Headache., Disp: , Rfl:   •  calcium citrate-vitamin d (CALCITRATE) 315-250 MG-UNIT tablet tablet, Take 1 tablet by mouth Daily., Disp: , Rfl:   •  cetirizine (zyrTEC) 10 MG tablet, TAKE 1 TABLET BY MOUTH DAILY, Disp: 30 tablet, Rfl: 0  •  clonazePAM (KlonoPIN) 1 MG tablet, Take 1 tablet by mouth 3 (Three) Times a Day As Needed for Anxiety or Seizures., Disp: 90 tablet, Rfl: 0  •  conjugated estrogens (PREMARIN) 0.625 MG/GM vaginal cream, Insert 1 gram vaginally twice weekly, Disp: 90 g, Rfl: 3  •  estrogens, conjugated, (PREMARIN) 1.25 MG tablet, Take 1  tablet by mouth Daily., Disp: 90 tablet, Rfl: 1  •  fluticasone (FLONASE) 50 MCG/ACT nasal spray, 1 spray into each nostril 2 (Two) Times a Day., Disp: 3 bottle, Rfl: 3  •  gabapentin (NEURONTIN) 800 MG tablet, Take 1 tablet by mouth 3 (Three) Times a Day., Disp: 270 tablet, Rfl: 0  •  HYDROcodone-acetaminophen (NORCO)  MG per tablet, Take 1/2 to 1 tablet by mouth every 6 hours as needed for pain, Disp: 120 tablet, Rfl: 0  •  KRILL OIL PO, Take 1 capsule by mouth 2 (two) times a day. Krill Oil 1,000 mg-170 mg-50 mg-80 mg capsule, Disp: , Rfl:   •  meloxicam (MOBIC) 15 MG tablet, Take 1 tablet by mouth Daily., Disp: 90 tablet, Rfl: 3  •  montelukast (SINGULAIR) 10 MG tablet, Take 1 tablet by mouth Every Night. Take 1 po  hs, Disp: 90 tablet, Rfl: 3  •  Multiple Vitamins-Minerals (ICAPS) capsule, Take 1 tablet by mouth Daily., Disp: , Rfl:   •  omeprazole (priLOSEC) 40 MG capsule, Take 1 capsule by mouth Daily., Disp: 90 capsule, Rfl: 3  •  oseltamivir (TAMIFLU) 75 MG capsule, Take 1 capsule by mouth 2 (Two) Times a Day., Disp: 10 capsule, Rfl: 0  •  QUEtiapine (SEROQUEL) 50 MG tablet, Take 1 tablet by mouth Every Night., Disp: 30 tablet, Rfl: 5  •  temazepam (RESTORIL) 30 MG capsule, Take 1 capsule by mouth At Night As Needed for Sleep., Disp: 30 capsule, Rfl: 0      Visit Dx:     ICD-10-CM ICD-9-CM   1. Chronic low back pain, unspecified back pain laterality, with sciatica presence unspecified M54.5 724.2    G89.29 338.29             Patient History     Row Name 04/18/18 0800             History    Chief Complaint Pain  -NH      Type of Pain Back pain  -NH      Brief Description of Current Complaint Patient fell at [employer] last January. Patient had Hx of previous lumbar fusion, but the fall exaccerbated the issues and caused incontinance. Patient had a stimulator put in to help with the incontinance. Patient has gotten another stimulator this past fall to reduce the R leg pain. Patient just got released to  lift over 5# last week. Go reports the stimulator takes care of the leg pain most of the time but she has constant LBP on R side. Patient has pain medication but doesn't take unless she absolutely needs them. Prior to her fall, Patient had been working out regularly. Patient's goal is to build strength and be more active with her family.   -NH      Occupation/sports/leisure activities Disabled; fishing and playing with grandkids, crocheting, outdoor activities  -NH         Pain     Pain Location Back  -NH      Pain at Present 3  -NH      Pain at Best 0  -NH      Pain at Worst 9  -NH      What Performance Factors Make the Current Problem(s) WORSE? Loading , prolonged walking  -NH      What Performance Factors Make the Current Problem(s) BETTER? Laying in recliner, laying on side in bed  -NH        User Key  (r) = Recorded By, (t) = Taken By, (c) = Cosigned By    Initials Name Provider Type    NH Connie Bacon, PT Physical Therapist                PT Ortho     Row Name 04/18/18 0800       Subjective Comments    Subjective Comments See Patient History for details.   -NH       Precautions and Contraindications    Precautions/Limitations spinal precautions  -NH    Precautions Hx lumbar fusion; 2 Implanted stimulators (i.e. NO estim, US, traction)  -NH       Posture/Observations    Posture/Observations Comments Multiple incision in LB, Stimulator palpable in R lumbar paraspinals.   -NH       Quarter Clearing    Quarter Clearing Lower Quarter Clearing  -NH       Neural Tension Signs- Lower Quarter Clearing    SLR Bilateral:;Negative  -NH       Lumbar ROM Screen- Lower Quarter Clearing    Lumbar Flexion Normal   fingerti to mid shin; pain upon return upright  -NH    Lumbar Extension Impaired   Neutral  -NH    Lumbar Lateral Flexion Impaired   Bilateral  -NH       MMT (Manual Muscle Testing)    Additional Documentation General Assessment (Manual Muscle Testing) (Group)  -NH       General Assessment (Manual  Muscle Testing)    General Manual Muscle Testing (MMT) Assessment lower extremity strength deficits identified  -NH       Lower Extremity (Manual Muscle Testing)    Lower Extremity: Manual Muscle Testing (MMT) left hip strength deficit;right hip strength deficit;left knee strength deficit;right knee strength deficit  -NH       Left Hip (Manual Muscle Testing)    MMT, Left Hip: Manual Muscle Testing (MMT) 3/5 flexion, 3/5 abduction, 4-/5 add  -NH       Right Hip (Manual Muscle Testing)    MMT, Right Hip: Manual Muscle Testing (MMT) 3/5 flexion, 3/5 abduction, 4-/5 add  -NH       Left Knee (Manual Muscle Testing)    Comment, Left Knee: Manual Muscle Testing (MMT) 4+/5 extension, 4/5 flexion  -NH       Right Knee (Manual Muscle Testing)    Comment, Right Knee: Manual Muscle Testing (MMT) 4+/5 extension, 4/5 flexion  -NH       Flexibility    Flexibility Tested? Lower Extremity  -NH       Lower Extremity Flexibility    Hamstrings Bilateral:;Mildly limited  -NH    Hip External Rotators Bilateral:;Mildly limited  -NH       Balance Skills Training    SLS <3 sec Bilateral  -NH    Balance Comments Tandem R leg lead 3 sec, L leg lead 6 sec  -NH      User Key  (r) = Recorded By, (t) = Taken By, (c) = Cosigned By    Initials Name Provider Type    NH Connie Bacon, PT Physical Therapist                                  PT OP Goals     Row Name 04/18/18 0800          PT Short Term Goals    STG Date to Achieve 05/09/18  -NH     STG 1 IND and compliant with HEP  -NH     STG 2 Patient will improve Modified OSWESTRY to 32%  -NH     STG 3 Patient will demonstrate 3+/5 B hip strength in all planes  -NH     STG 4 Patient will be able to perform tandem stance on static surface 30 seconds  -NH     STG 5 Patient will have 8-10 sec SLS on static surface  -NH        Long Term Goals    LTG Date to Achieve 05/30/18  -NH     LTG 1 Patient will improve Modified OSWESTRY to les than 25%  -NH     LTG 2 Patient will be able to perform light  lifting 5-10# from floor to waist with good mechanics  -NH     LTG 3 Patient will be able to tolerate 45 min treatment session of standcing activity with no greather than 2 point increase in pain on VAS.  -NH     LTG 4 Patient will have 4/5 B hip strength in all planes   -NH     LTG 5 Patient will be able to perform tandem balance on compliant surface 30 sec  -NH     LTG 6 Patient will have LB flexiblity WNL bilaterally.   -NH        Time Calculation    PT Goal Re-Cert Due Date 05/09/18  -NH       User Key  (r) = Recorded By, (t) = Taken By, (c) = Cosigned By    Initials Name Provider Type    NH Connie Bacon, PT Physical Therapist                PT Assessment/Plan     Row Name 04/18/18 0800          PT Assessment    Functional Limitations Decreased safety during functional activities;Limitation in home management;Limitations in community activities;Limitations in functional capacity and performance;Performance in leisure activities;Performance in self-care ADL  -NH     Impairments Balance;Endurance;Impaired flexibility;Joint mobility;Muscle strength;Pain;Poor body mechanics;Posture;Range of motion  -NH     Assessment Comments Patient is a 53 year old female presenting to the clinic with chronic LBP limiting functional mobility and activity tolerance. Patient has Hx of radicular symptoms; however, neural tension signs negative at time of evaluation. Patient does present with significant core, hip and postural stability weakness and balance impairments. Patient would benefit from skilled physical therapy to address deficits and improve functional mobility and safety with ADLs.   -NH     Please refer to paper survey for additional self-reported information Yes  -NH     Rehab Potential Good  -NH     Patient/caregiver participated in establishment of treatment plan and goals Yes  -NH     Patient would benefit from skilled therapy intervention Yes  -NH        PT Plan    PT Frequency 3x/week;2x/week  -NH     Predicted  Duration of Therapy Intervention (OT Eval) 6-8 weeks  -NH     Planned CPT's? PT EVAL LOW COMPLEXITY: 77187;PT RE-EVAL: 80078;PT THER PROC EA 15 MIN: 48536;PT THER ACT EA 15 MIN: 46227;PT MANUAL THERAPY EA 15 MIN: 15274;PT NEUROMUSC RE-EDUCATION EA 15 MIN: 07937;PT ELECTRICAL STIM UNATTEND: ;PT ULTRASOUND EA 15 MIN: 98940;PT TRACTION LUMBAR: 26346;PT THER SUPP EA 15 MIN  -NH     Physical Therapy Interventions (Optional Details) balance training;bed mobility training;home exercise program;lumbar stabilization;manual therapy techniques;modalities;neuromuscular re-education;patient/family education;postural re-education;ROM (Range of Motion);stair training;strengthening;stretching;swiss ball techniques  -NH     PT Plan Comments Start with 3x/week and reduce after 2-3 weeks. Focus on TA, Hip abd/ext, Multifidus, scapular strength, etc. TA with functional movements to build endurance.   -NH       User Key  (r) = Recorded By, (t) = Taken By, (c) = Cosigned By    Initials Name Provider Type    NH Connie Bacon, PT Physical Therapist                  Exercises     Row Name 04/18/18 0800             Subjective Comments    Subjective Comments See Patient History for details.   -NH         Aquatics    Aquatics performed? No  -NH         Exercise 1    Exercise Name 1 TA isometric education  -NH      Reps 1 10  -NH      Time 1 10 sec  -NH         Exercise 2    Exercise Name 2 TA + unilateral heel slide  -NH      Reps 2 10  -NH         Exercise 3    Exercise Name 3 TA+ hip add squeeze  -NH      Reps 3 10  -NH      Time 3 5 sec  -NH         Exercise 4    Exercise Name 4 TA + hip abd  -NH      Reps 4 10  -NH      Additional Comments green tband  -NH        User Key  (r) = Recorded By, (t) = Taken By, (c) = Cosigned By    Initials Name Provider Type    NH Connie Bacon, PT Physical Therapist                        Outcome Measure Options: Modifed Owestry  Modified Oswestry  Modified Oswestry Score/Comments: 42%      Time  Calculation:   Start Time: 0852  Stop Time: 0923  Time Calculation (min): 31 min  Total Timed Code Minutes- PT: 0 minute(s)     Therapy Charges for Today     Code Description Service Date Service Provider Modifiers Qty    85381258884 HC PT EVAL LOW COMPLEXITY 2 4/18/2018 Connie Bacon, PT GP 1          PT G-Codes  Outcome Measure Options: Modifed Owestry         Connie Bacon, PT  4/18/2018

## 2018-04-24 ENCOUNTER — HOSPITAL ENCOUNTER (OUTPATIENT)
Dept: PHYSICAL THERAPY | Facility: HOSPITAL | Age: 53
Setting detail: THERAPIES SERIES
Discharge: HOME | End: 2018-04-24

## 2018-04-24 DIAGNOSIS — M54.5 CHRONIC LOW BACK PAIN, UNSPECIFIED BACK PAIN LATERALITY, WITH SCIATICA PRESENCE UNSPECIFIED: Primary | ICD-10-CM

## 2018-04-24 DIAGNOSIS — G89.29 CHRONIC LOW BACK PAIN, UNSPECIFIED BACK PAIN LATERALITY, WITH SCIATICA PRESENCE UNSPECIFIED: Primary | ICD-10-CM

## 2018-04-24 PROCEDURE — 97110 THERAPEUTIC EXERCISES: CPT

## 2018-04-24 NOTE — PROGRESS NOTES
Outpatient Physical Therapy Ortho Treatment Note   [facility]     Patient Name: Shania Sweeney  : 1965  MRN: [id number]  Today's Date: 2018      Visit Date: 2018    Subjective Improvement:    NA   Attendance:   Approved:   Per medical necessity        MD follow up:  ?          date:  18       Visit Dx:    ICD-10-CM ICD-9-CM   1. 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   • Anxiety   • Neck pain   • Brachial neuritis   • Bronchitis   • Urinary frequency        Past Medical History:   Diagnosis Date   • Abnormal Pap smear of cervix    • Acute bronchitis     Improving   • Anxiety    • Asthma    • 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    • Chronic back pain     C spine surgery   • Chronic headache disorder    • Cough    • Degenerative joint disease involving multiple joints    • Depression    • Rossana-Danlos syndrome    • GERD (gastroesophageal reflux disease)    • Hip pain    • Hyperlipidemia     Unspecified   • Incontinence    • Insomnia    • Lumbosacral neuritis    • Multiple joint pain    • Neck pain    • Need for prophylactic vaccination and inoculation against influenza    • Obesity    • Pain in joint involving shoulder region     Left   • Rash and nonspecific skin eruption     Rash and other nonspecific skin eruption      • Spasm of back muscles    • Spasm of muscle         Past Surgical History:   Procedure Laterality Date   • APPENDECTOMY     • BARIATRIC SURGERY     • CERVICAL SPINE SURGERY N/A    • CHOLECYSTECTOMY     • EPIDURAL LEAD INSERTION N/A  10/27/2017    Procedure: INTERSTIM STAGES ONE AND TWO   (C-ARM AND C-ARM TABLE);  Surgeon: Farshad Metzger MD;  Location: [facility];  Service:    • FACIAL RECONSTRUCTION SURGERY N/A    • HYSTERECTOMY  2006    with BSO   • INJECTION OF MEDICATION  09/24/2015    Depo Medrol (Methylprednisone) 80mg    • INJECTION OF MEDICATION  03/19/2015    Toradol   • LUMBAR SPINE SURGERY N/A    • PROCEDURE GENERIC CONVERTED  05/24/2013    Nebulizer Treatment   • RECONSTRUCTION OF NOSE N/A    • SHOULDER ROTATOR CUFF REPAIR Right     x 2   • TONSILLECTOMY     • TRIGGER POINT INJECTION  03/02/2016    [place] Pt Inj, sing/multi pts. 3/+ muscles              PT Ortho     Row Name 04/24/18 1500       Subjective Comments    Subjective Comments Reports that the stimulator has helped her leg, but weakened her back muscles.    -TM       Precautions and Contraindications    Precautions/Limitations spinal precautions  -TM    Precautions Hx lumbar fusion; 2 Implanted stimulators (i.e. NO estim, US, traction)  -TM      User Key  (r) = Recorded By, (t) = Taken By, (c) = Cosigned By    Initials Name Provider Type    TM Ivanna Sams PTA Physical Therapy Assistant                            PT Assessment/Plan     Row Name 04/24/18 1500          PT Assessment    Assessment Comments Pt was challenged with prone hip ext, but able to complete.  No difficulty with other exercises.  -TM     Patient/caregiver participated in establishment of treatment plan and goals Yes  -TM        PT Plan    PT Frequency 3x/week;2x/week  -TM     Predicted Duration of Therapy Intervention (OT Eval) 6-8 weeks  -TM     PT Plan Comments Standing core exercises, tandem stance  -TM       User Key  (r) = Recorded By, (t) = Taken By, (c) = Cosigned By    Initials Name Provider Type    FOX Sams PTA Physical Therapy Assistant                    Exercises     Row Name 04/24/18 1500             Subjective Comments    Subjective Comments Reports that the stimulator  has helped her leg, but weakened her back muscles.    -TM         Exercise 1    Exercise Name 1 PRO II for ROM/endurance  -TM      Time 1 8 min  -TM      Additional Comments L2  -TM         Exercise 2    Exercise Name 2 trans abs with hip Add  -TM      Sets 2 2  -TM      Reps 2 10  -TM         Exercise 3    Exercise Name 3 trans abs with heelslides  -TM      Sets 3 2  -TM      Reps 3 10  -TM         Exercise 4    Exercise Name 4 trans abs with T Band hip Abd  -TM      Sets 4 2  -TM      Reps 4 10  -TM      Additional Comments green  -TM         Exercise 5    Exercise Name 5 SL clamshells  -TM      Sets 5 2  -TM      Reps 5 10  -TM      Additional Comments bilateral  -TM         Exercise 6    Exercise Name 6 SL hip Abd SLR  -TM      Sets 6 2  -TM      Reps 6 10  -TM         Exercise 7    Exercise Name 7 prone hip ext  -TM      Sets 7 2  -TM      Reps 7 10  -TM      Additional Comments bilateral  -TM         Exercise 8    Exercise Name 8 prone TKE  -TM      Sets 8 2  -TM      Reps 8 10  -TM        User Key  (r) = Recorded By, (t) = Taken By, (c) = Cosigned By    Initials Name Provider Type    TM Ivanna Sams, PTA Physical Therapy Assistant                               PT OP Goals     Row Name 04/24/18 1500          PT Short Term Goals    STG Date to Achieve 05/09/18  -TM     STG 1 IND and compliant with HEP  -TM     STG 1 Progress Ongoing  -TM     STG 2 Patient will improve Modified OSWESTRY to 32%  -TM     STG 2 Progress Ongoing  -TM     STG 3 Patient will demonstrate 3+/5 B hip strength in all planes  -TM     STG 3 Progress Ongoing  -TM     STG 4 Patient will be able to perform tandem stance on static surface 30 seconds  -TM     STG 4 Progress Ongoing  -TM     STG 5 Patient will have 8-10 sec SLS on static surface  -TM     STG 5 Progress Ongoing  -TM        Long Term Goals    LTG Date to Achieve 05/30/18  -TM     LTG 1 Patient will improve Modified OSWESTRY to les than 25%  -TM     LTG 1 Progress Ongoing  -      LTG 2 Patient will be able to perform light lifting 5-10# from floor to waist with good mechanics  -     LTG 2 Progress Ongoing  -     LTG 3 Patient will be able to tolerate 45 min treatment session of standcing activity with no greather than 2 point increase in pain on VAS.  -TM     LTG 3 Progress Ongoing  -TM     LTG 4 Patient will have 4/5 B hip strength in all planes   -     LTG 4 Progress Ongoing  -     LTG 5 Patient will be able to perform tandem balance on compliant surface 30 sec  -     LTG 5 Progress Ongoing  -     LTG 6 Patient will have LB flexiblity WNL bilaterally.   -     LTG 6 Progress Ongoing  -       User Key  (r) = Recorded By, (t) = Taken By, (c) = Cosigned By    Initials Name Provider Type     Ivanna Sams PTA Physical Therapy Assistant                         Time Calculation:   Start Time: 1515  Stop Time: 1600  Time Calculation (min): 45 min  Total Timed Code Minutes- PT: 45 minute(s)    Therapy Charges for Today     Code Description Service Date Service Provider Modifiers Qty    37596951393 HC PT THER PROC EA 15 MIN 4/24/2018 Ivanna Sams PTA GP 3                    Ivanna Sams PTA  4/24/2018

## 2018-04-25 ENCOUNTER — HOSPITAL ENCOUNTER (OUTPATIENT)
Dept: PHYSICAL THERAPY | Facility: HOSPITAL | Age: 53
Setting detail: THERAPIES SERIES
Discharge: HOME | End: 2018-04-25

## 2018-04-25 DIAGNOSIS — M54.5 CHRONIC LOW BACK PAIN, UNSPECIFIED BACK PAIN LATERALITY, WITH SCIATICA PRESENCE UNSPECIFIED: Primary | ICD-10-CM

## 2018-04-25 DIAGNOSIS — G89.29 CHRONIC LOW BACK PAIN, UNSPECIFIED BACK PAIN LATERALITY, WITH SCIATICA PRESENCE UNSPECIFIED: Primary | ICD-10-CM

## 2018-04-25 PROCEDURE — 97110 THERAPEUTIC EXERCISES: CPT | Performed by: PHYSICAL THERAPY ASSISTANT

## 2018-04-25 NOTE — THERAPY TREATMENT NOTE
Outpatient Physical Therapy Ortho Treatment Note  [facility]     Patient Name: Shania Sweeney  : 1965  MRN: [id number]  Today's Date: 2018      Visit Date: 2018  Subjective Improvement:    NA   Attendance: 3/3  Approved:   Per medical necessity        MD follow up:  ?          date:  18     Visit Dx:    ICD-10-CM ICD-9-CM   1. 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   • Anxiety   • Neck pain   • Brachial neuritis   • Bronchitis   • Urinary frequency        Past Medical History:   Diagnosis Date   • Abnormal Pap smear of cervix    • Acute bronchitis     Improving   • Anxiety    • Asthma    • 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    • Chronic back pain     C spine surgery   • Chronic headache disorder    • Cough    • Degenerative joint disease involving multiple joints    • Depression    • Rossana-Danlos syndrome    • GERD (gastroesophageal reflux disease)    • Hip pain    • Hyperlipidemia     Unspecified   • Incontinence    • Insomnia    • Lumbosacral neuritis    • Multiple joint pain    • Neck pain    • Need for prophylactic vaccination and inoculation against influenza    • Obesity    • Pain in joint involving shoulder region     Left   • Rash and nonspecific skin eruption     Rash and other nonspecific skin eruption      • Spasm of back muscles    • Spasm of muscle         Past Surgical History:   Procedure Laterality Date   • APPENDECTOMY     • BARIATRIC SURGERY     • CERVICAL SPINE SURGERY N/A    • CHOLECYSTECTOMY     • EPIDURAL LEAD INSERTION N/A  10/27/2017    Procedure: INTERSTIM STAGES ONE AND TWO   (C-ARM AND C-ARM TABLE);  Surgeon: Farshad Metzger MD;  Location: [facility];  Service:    • FACIAL RECONSTRUCTION SURGERY N/A    • HYSTERECTOMY  2006    with BSO   • INJECTION OF MEDICATION  09/24/2015    Depo Medrol (Methylprednisone) 80mg    • INJECTION OF MEDICATION  03/19/2015    Toradol   • LUMBAR SPINE SURGERY N/A    • PROCEDURE GENERIC CONVERTED  05/24/2013    Nebulizer Treatment   • RECONSTRUCTION OF NOSE N/A    • SHOULDER ROTATOR CUFF REPAIR Right     x 2   • TONSILLECTOMY     • TRIGGER POINT INJECTION  03/02/2016    [place] Pt Inj, sing/multi pts. 3/+ muscles              PT Ortho     Row Name 04/25/18 1300       Subjective Comments    Subjective Comments Pt reports she is just sore today  -       Precautions and Contraindications    Precautions/Limitations spinal precautions  -    Precautions Hx lumbar fusion; 2 Implanted stimulators (i.e. NO estim, US, traction)  -       Subjective Pain    Post-Treatment Pain Level 0  -    Row Name 04/24/18 1500       Subjective Comments    Subjective Comments Reports that the stimulator has helped her leg, but weakened her back muscles.    -       Precautions and Contraindications    Precautions/Limitations spinal precautions  -    Precautions Hx lumbar fusion; 2 Implanted stimulators (i.e. NO estim, US, traction)  -      User Key  (r) = Recorded By, (t) = Taken By, (c) = Cosigned By    Initials Name Provider Type     Ivanna Sams, PTA Physical Therapy Assistant     Karthikeyan Tena PTA Physical Therapy Assistant                            PT Assessment/Plan     Row Name 04/25/18 1400 04/24/18 1500       PT Assessment    Assessment Comments Pt was limited with supine SLR 2° weakness. but able to complete all ther ex well.  - Pt was challenged with prone hip ext, but able to complete.  No difficulty with other exercises.  -    Patient/caregiver participated in establishment of treatment plan  "and goals  -- Yes  -TM       PT Plan    PT Frequency 3x/week;2x/week  -JH 3x/week;2x/week  -TM    Predicted Duration of Therapy Intervention (OT Eval) 6-8 weeks  -JH 6-8 weeks  -TM    PT Plan Comments multifidus step ups  -JH Standing core exercises, tandem stance  -TM      User Key  (r) = Recorded By, (t) = Taken By, (c) = Cosigned By    Initials Name Provider Type    TM Ivanna Sams, [facility] Physical Therapy Assistant     Karthikeyan Tena, [facility] Physical Therapy Assistant                    Exercises     Row Name 04/25/18 1300 04/24/18 1500          Subjective Comments    Subjective Comments Pt reports she is just sore today  -JH Reports that the stimulator has helped her leg, but weakened her back muscles.    -TM        Subjective Pain    Able to rate subjective pain? yes  -JH  --     Pre-Treatment Pain Level 0   sore  -JH  --     Post-Treatment Pain Level 0  -JH  --        Exercise 1    Exercise Name 1 PRO II for ROM/endurance  -JH PRO II for ROM/endurance  -TM     Time 1 8 min  -JH 8 min  -TM     Additional Comments L2  -JH L2  -TM        Exercise 2    Exercise Name 2 trans abs with hip Add  -JH trans abs with hip Add  -TM     Sets 2 2  -JH 2  -TM     Reps 2 10  -JH 10  -TM     Time 2 5\" hold  -JH  --        Exercise 3    Exercise Name 3 TA+ hip abd  -JH trans abs with heelslides  -TM     Sets 3 2  -JH 2  -TM     Reps 3 10  -JH 10  -TM     Time 3 5\" hold  -JH  --     Additional Comments green  -JH  --        Exercise 4    Exercise Name 4 ST hip 3 way  -JH trans abs with T Band hip Abd  -TM     Sets 4 2  -JH 2  -TM     Reps 4 10  -JH 10  -TM     Additional Comments  -- green  -TM        Exercise 5    Exercise Name 5 tandem stance  -JH SL clamshells  -TM     Sets 5 1  -JH 2  -TM     Reps 5 2  -JH 10  -TM     Time 5 20\" ea  -JH  --     Additional Comments igor LE leading  -JH bilateral  -TM        Exercise 6    Exercise Name 6 mid rows  -JH SL hip Abd SLR  -TM     Sets 6 2  -JH 2  -TM     Reps 6 10  -JH 10  -TM     " Additional Comments geen  -  --        Exercise 7    Exercise Name 7 shoulder ext  -JH prone hip ext  -TM     Sets 7 2  -JH 2  -TM     Reps 7 10  -JH 10  -TM     Additional Comments green  - bilateral  -TM        Exercise 8    Exercise Name 8 prone hip ext  -JH prone TKE  -TM     Sets 8 2  -JH 2  -TM     Reps 8 10  -JH 10  -TM        Exercise 9    Exercise Name 9 prone TKE  -JH  --     Sets 9 2  -JH  --     Reps 9 10  -JH  --        Exercise 10    Exercise Name 10 S/L hip abd SLR  -  --     Sets 10 2  -JH  --     Reps 10 10  -JH  --        Exercise 11    Exercise Name 11 .  -  --       User Key  (r) = Recorded By, (t) = Taken By, (c) = Cosigned By    Initials Name Provider Type    TM Ivanna Sams, PTA Physical Therapy Assistant     Karthikeyan Tena, PTA Physical Therapy Assistant                               PT OP Goals     Row Name 04/25/18 1400 04/24/18 1500       PT Short Term Goals    STG Date to Achieve 05/09/18  - 05/09/18  -    STG 1 IND and compliant with HEP  - IND and compliant with HEP  -    STG 1 Progress [facility] Ongoing  -    STG 2 Patient will improve Modified OSWESTRY to 32%  - Patient will improve Modified OSWESTRY to 32%  -TM    STG 2 Progress [facility] Ongoing  [facility]    STG 3 Patient will demonstrate 3+/5 B hip strength in all planes  - Patient will demonstrate 3+/5 B hip strength in all planes  -TM    STG 3 Progress [facility] Ongoing  -    STG 4 Patient will be able to perform tandem stance on static surface 30 seconds  - Patient will be able to perform tandem stance on static surface 30 seconds  -TM    STG 4 Progress [facility] Ongoing  [facility]    STG 5 Patient will have 8-10 sec SLS on static surface  - Patient will have 8-10 sec SLS on static surface  -TM    STG 5 Progress [facility]       Long Term Goals    LTG Date to Achieve 05/30/18  - 05/30/18  -    LTG 1 Patient will improve Modified OSWESTRY to les than 25%  - Patient will  improve Modified OSWESTRY to les than 25%  -    LTG 1 Progress Ongoing  - Ongoing  -    LTG 2 Patient will be able to perform light lifting 5-10# from floor to waist with good mechanics  - Patient will be able to perform light lifting 5-10# from floor to waist with good mechanics  -    LTG 2 Progress Ongoing  - Ongoing  -    LTG 3 Patient will be able to tolerate 45 min treatment session of standcing activity with no greather than 2 point increase in pain on VAS.  - Patient will be able to tolerate 45 min treatment session of standcing activity with no greather than 2 point increase in pain on VAS.  -    LTG 3 Progress Ongoing  - Ongoing  -    LTG 4 Patient will have 4/5 B hip strength in all planes   - Patient will have 4/5 B hip strength in all planes   -    LTG 4 Progress Ongoing  [facility] Ongoing  -    LTG 5 Patient will be able to perform tandem balance on compliant surface 30 sec  - Patient will be able to perform tandem balance on compliant surface 30 sec  -    LTG 5 Progress Ongoing  [facility] Ongoing  -    LTG 6 Patient will have LB flexiblity WNL bilaterally.   - Patient will have LB flexiblity WNL bilaterally.   -    LTG 6 Progress Ongoing  -[facility]  -       Time Calculation    PT Goal Re-Cert Due Date 05/09/18  -  --      User Key  (r) = Recorded By, (t) = Taken By, (c) = Cosigned By    Initials Name Provider Type     Ivanna Sams PTA Physical Therapy Assistant     Karthikeyan Tena PTA Physical Therapy Assistant                         Time Calculation:   Start Time: 1347  Stop Time: 1426  Time Calculation (min): 39 min    Therapy Charges for Today     Code Description Service Date Service Provider Modifiers Qty    05420393639  PT THER PROC EA 15 MIN 4/25/2018 Karthikeyan Tena PTA GP 3                    Karthikeyan Tena PTA  4/25/2018

## 2018-05-03 ENCOUNTER — HOSPITAL ENCOUNTER (OUTPATIENT)
Dept: PHYSICAL THERAPY | Facility: HOSPITAL | Age: 53
Setting detail: THERAPIES SERIES
Discharge: HOME | End: 2018-05-03

## 2018-05-03 DIAGNOSIS — G89.29 CHRONIC LOW BACK PAIN, UNSPECIFIED BACK PAIN LATERALITY, WITH SCIATICA PRESENCE UNSPECIFIED: Primary | ICD-10-CM

## 2018-05-03 DIAGNOSIS — M54.5 CHRONIC LOW BACK PAIN, UNSPECIFIED BACK PAIN LATERALITY, WITH SCIATICA PRESENCE UNSPECIFIED: Primary | ICD-10-CM

## 2018-05-03 PROCEDURE — 97140 MANUAL THERAPY 1/> REGIONS: CPT

## 2018-05-03 PROCEDURE — 97110 THERAPEUTIC EXERCISES: CPT

## 2018-05-03 NOTE — PROGRESS NOTES
Outpatient Physical Therapy Ortho Treatment Note   [facility]     Patient Name: Shania Sweeney  : 1965  MRN: [id number]  Today's Date: 5/3/2018      Visit Date: 2018    Subjective Improvement:   none      Attendance:   Approved:  Per medical necessity         MD follow up:  ?          date:    18     Visit Dx:    ICD-10-CM ICD-9-CM   1. 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   • Anxiety   • Neck pain   • Brachial neuritis   • Bronchitis   • Urinary frequency        Past Medical History:   Diagnosis Date   • Abnormal Pap smear of cervix    • Acute bronchitis     Improving   • Anxiety    • Asthma    • 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    • Chronic back pain     C spine surgery   • Chronic headache disorder    • Cough    • Degenerative joint disease involving multiple joints    • Depression    • Rossana-Danlos syndrome    • GERD (gastroesophageal reflux disease)    • Hip pain    • Hyperlipidemia     Unspecified   • Incontinence    • Insomnia    • Lumbosacral neuritis    • Multiple joint pain    • Neck pain    • Need for prophylactic vaccination and inoculation against influenza    • Obesity    • Pain in joint involving shoulder region     Left   • Rash and nonspecific skin eruption     Rash and other nonspecific skin eruption      • Spasm of back muscles    • Spasm of muscle         Past Surgical History:   Procedure Laterality Date   • APPENDECTOMY     • BARIATRIC SURGERY     • CERVICAL SPINE SURGERY N/A    • CHOLECYSTECTOMY     • EPIDURAL LEAD INSERTION  N/A 10/27/2017    Procedure: INTERSTIM STAGES ONE AND TWO   (C-ARM AND C-ARM TABLE);  Surgeon: Farshad Metzger MD;  Location: [facility];  Service:    • FACIAL RECONSTRUCTION SURGERY N/A    • HYSTERECTOMY  2006    with BSO   • INJECTION OF MEDICATION  09/24/2015    Depo Medrol (Methylprednisone) 80mg    • INJECTION OF MEDICATION  03/19/2015    Toradol   • LUMBAR SPINE SURGERY N/A    • PROCEDURE GENERIC CONVERTED  05/24/2013    Nebulizer Treatment   • RECONSTRUCTION OF NOSE N/A    • SHOULDER ROTATOR CUFF REPAIR Right     x 2   • TONSILLECTOMY     • TRIGGER POINT INJECTION  03/02/2016    [place] Pt Inj, sing/multi pts. 3/+ muscles              PT Ortho     Row Name 05/03/18 1500       Precautions and Contraindications    Precautions/Limitations spinal precautions  -TM    Precautions Hx lumbar fusion; 2 Implanted stimulators (i.e. NO estim, US, traction)  -TM       Subjective Pain    Post-Treatment Pain Level 2  -TM       Posture/Observations    Posture/Observations Comments Taut R UT & B lumbar paraspinals with multiple trigger points  -TM      User Key  (r) = Recorded By, (t) = Taken By, (c) = Cosigned By    Initials Name Provider Type    TM Ivanna Sams PTA Physical Therapy Assistant                            PT Assessment/Plan     Row Name 05/03/18 1500          PT Assessment    Assessment Comments Pt had difficulty with position changes & c/o pain with therex.  Displays increased tension in musculature of lumbar spine & R scapula.  Did report improvement post STM & MH.    -TM        PT Plan    PT Frequency 3x/week;2x/week  -TM     Predicted Duration of Therapy Intervention (OT Eval) 6-8 weeks  -TM     PT Plan Comments Cont manual RX prn.  Progress hip & core strength as tolerated.  -TM       User Key  (r) = Recorded By, (t) = Taken By, (c) = Cosigned By    Initials Name Provider Type     Ivanna Sams PTA Physical Therapy Assistant                Modalities     Row Name 05/03/18 1500              Moist Heat    MH Applied Yes  -TM      Location lumbar/thoracic  -TM      Rx Minutes 15 mins  -TM      MH S/P Rx Yes  -TM        User Key  (r) = Recorded By, (t) = Taken By, (c) = Cosigned By    Initials Name Provider Type    TM Ivanna Sams PTA Physical Therapy Assistant                Exercises     Row Name 05/03/18 1500             Subjective Comments    Subjective Comments Pt reports that she has hurt really bad since last treatment.  Thinks the increased intensity of therex was too much.  -TM         Subjective Pain    Able to rate subjective pain? yes  -TM      Pre-Treatment Pain Level 5  -TM      Post-Treatment Pain Level 2  -TM         Exercise 1    Exercise Name 1 PRO II for ROM/endurance  -TM      Time 1 8 min  -TM      Additional Comments L2  -TM         Exercise 2    Exercise Name 2 trans ab  -TM      Sets 2 2  -TM      Reps 2 10  -TM         Exercise 3    Exercise Name 3 T Band hip Abd  -TM      Sets 3 2  -TM      Reps 3 10  -TM         Exercise 4    Exercise Name 4 SL clamshells  -TM      Sets 4 2  -TM      Reps 4 10  -TM      Additional Comments bilateral  -TM         Exercise 5    Exercise Name 5 prone TKE/GS  -TM      Sets 5 2  -TM      Reps 5 10  -TM         Exercise 6    Exercise Name 6 prone T Band hip IR  -TM      Sets 6 2  -TM      Reps 6 10  -TM      Additional Comments yellow  -TM         Exercise 7    Exercise Name 7 T Band rows  -TM      Sets 7 2  -TM      Reps 7 10  -TM      Additional Comments green  -TM         Exercise 8    Exercise Name 8 T Band shoulder ext  -TM      Sets 8 2  -TM      Reps 8 10  -TM      Additional Comments green  -TM        User Key  (r) = Recorded By, (t) = Taken By, (c) = Cosigned By    Initials Name Provider Type    TM Ivanna Sams PTA Physical Therapy Assistant                        Manual Rx (last 36 hours)      Manual Treatments     Row Name 05/03/18 1500             Manual Rx 1    Manual Rx 1 Location R UT & lumbar paraspinals  -TM      Manual  Rx 1 Type STM/MFR  -TM      Manual Rx 1 Duration 10 min  -TM        User Key  (r) = Recorded By, (t) = Taken By, (c) = Cosigned By    Initials Name Provider Type     Ivanna Sams PTA Physical Therapy Assistant                PT OP Goals     Row Name 05/03/18 1600          PT Short Term Goals    STG Date to Achieve 05/09/18  -TM     STG 1 IND and compliant with HEP  -TM     STG 1 Progress Ongoing  -TM     STG 2 Patient will improve Modified OSWESTRY to 32%  -TM     STG 2 Progress Ongoing  -TM     STG 3 Patient will demonstrate 3+/5 B hip strength in all planes  -TM     STG 3 Progress Ongoing  -TM     STG 4 Patient will be able to perform tandem stance on static surface 30 seconds  -TM     STG 4 Progress Ongoing  -TM     STG 5 Patient will have 8-10 sec SLS on static surface  -TM     STG 5 Progress Ongoing  -TM        Long Term Goals    LTG Date to Achieve 05/30/18  -TM     LTG 1 Patient will improve Modified OSWESTRY to les than 25%  -TM     LTG 1 Progress Ongoing  -TM     LTG 2 Patient will be able to perform light lifting 5-10# from floor to waist with good mechanics  -TM     LTG 2 Progress Ongoing  -TM     LTG 3 Patient will be able to tolerate 45 min treatment session of standcing activity with no greather than 2 point increase in pain on VAS.  -TM     LTG 3 Progress Ongoing  -TM     LTG 4 Patient will have 4/5 B hip strength in all planes   -TM     LTG 4 Progress Ongoing  -TM     LTG 5 Patient will be able to perform tandem balance on compliant surface 30 sec  -TM     LTG 5 Progress Ongoing  -TM     LTG 6 Patient will have LB flexiblity WNL bilaterally.   -TM     LTG 6 Progress Ongoing  -TM       User Key  (r) = Recorded By, (t) = Taken By, (c) = Cosigned By    Initials Name Provider Type     Ivanna Sams PTA Physical Therapy Assistant                         Time Calculation:   Start Time: 1515  Stop Time: 1615  Time Calculation (min): 60 min  PT Non-Billable Time (min): 15 min  Total Timed  Code Minutes- PT: 45 minute(s)    Therapy Charges for Today     Code Description Service Date Service Provider Modifiers Qty    24142936361 HC PT MANUAL THERAPY EA 15 MIN 5/3/2018 Ivanna Sams, PTA GP 1    60829637732 HC PT THER PROC EA 15 MIN 5/3/2018 Ivanna Sams, PTA GP 2    67508970676 HC PT THER SUPP EA 15 MIN 5/3/2018 Ivanna Sams, PTA GP 1                    Ivanna Sams, PTA  5/3/2018

## 2018-05-07 ENCOUNTER — APPOINTMENT (OUTPATIENT)
Dept: PHYSICAL THERAPY | Facility: HOSPITAL | Age: 53
End: 2018-05-07

## 2018-05-09 ENCOUNTER — HOSPITAL ENCOUNTER (OUTPATIENT)
Dept: PHYSICAL THERAPY | Facility: HOSPITAL | Age: 53
Setting detail: THERAPIES SERIES
Discharge: HOME | End: 2018-05-09

## 2018-05-09 DIAGNOSIS — G89.29 CHRONIC LOW BACK PAIN, UNSPECIFIED BACK PAIN LATERALITY, WITH SCIATICA PRESENCE UNSPECIFIED: Primary | ICD-10-CM

## 2018-05-09 DIAGNOSIS — M54.5 CHRONIC LOW BACK PAIN, UNSPECIFIED BACK PAIN LATERALITY, WITH SCIATICA PRESENCE UNSPECIFIED: Primary | ICD-10-CM

## 2018-05-09 PROCEDURE — 97140 MANUAL THERAPY 1/> REGIONS: CPT | Performed by: PHYSICAL THERAPY ASSISTANT

## 2018-05-09 PROCEDURE — 97110 THERAPEUTIC EXERCISES: CPT | Performed by: PHYSICAL THERAPY ASSISTANT

## 2018-05-09 NOTE — THERAPY TREATMENT NOTE
Outpatient Physical Therapy Ortho Treatment Note  [facility]     Patient Name: Shania Sweeney  : 1965  MRN: [id number]  Today's Date: 2018      Visit Date: 2018  Subjective Improvement:   35-40%   Attendance:   Approved:  Per medical necessity         MD follow up:  ?          date:    18       Visit Dx:    ICD-10-CM ICD-9-CM   1. 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   • Anxiety   • Neck pain   • Brachial neuritis   • Bronchitis   • Urinary frequency        Past Medical History:   Diagnosis Date   • Abnormal Pap smear of cervix    • Acute bronchitis     Improving   • Anxiety    • Asthma    • 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    • Chronic back pain     C spine surgery   • Chronic headache disorder    • Cough    • Degenerative joint disease involving multiple joints    • Depression    • Rossana-Danlos syndrome    • GERD (gastroesophageal reflux disease)    • Hip pain    • Hyperlipidemia     Unspecified   • Incontinence    • Insomnia    • Lumbosacral neuritis    • Multiple joint pain    • Neck pain    • Need for prophylactic vaccination and inoculation against influenza    • Obesity    • Pain in joint involving shoulder region     Left   • Rash and nonspecific skin eruption     Rash and other nonspecific skin eruption      • Spasm of back muscles    • Spasm of muscle         Past Surgical History:   Procedure Laterality Date   • APPENDECTOMY     • BARIATRIC SURGERY     • CERVICAL SPINE SURGERY N/A    • CHOLECYSTECTOMY     • EPIDURAL LEAD INSERTION N/A  10/27/2017    Procedure: INTERSTIM STAGES ONE AND TWO   (C-ARM AND C-ARM TABLE);  Surgeon: Farshad Metzger MD;  Location: [facility] OR;  Service:    • FACIAL RECONSTRUCTION SURGERY N/A    • HYSTERECTOMY  2006    with BSO   • INJECTION OF MEDICATION  09/24/2015    Depo Medrol (Methylprednisone) 80mg    • INJECTION OF MEDICATION  03/19/2015    Toradol   • LUMBAR SPINE SURGERY N/A    • PROCEDURE GENERIC CONVERTED  05/24/2013    Nebulizer Treatment   • RECONSTRUCTION OF NOSE N/A    • SHOULDER ROTATOR CUFF REPAIR Right     x 2   • TONSILLECTOMY     • TRIGGER POINT INJECTION  03/02/2016    [facility] Pt Inj, sing/multi pts. 3/+ muscles              PT Ortho     Row Name 05/09/18 1000       Subjective Comments    Subjective Comments Pt reports that her back is a constant pain  -       Precautions and Contraindications    Precautions/Limitations spinal precautions  -    Precautions Hx lumbar fusion; 2 Implanted stimulators (i.e. NO estim, US, traction)  -       Subjective Pain    Able to rate subjective pain? yes  -    Pre-Treatment Pain Level 3  -    Post-Treatment Pain Level 0  -      User Key  (r) = Recorded By, (t) = Taken By, (c) = Cosigned By    Initials Name Provider Type     Karthikeyan Tena PTA Physical Therapy Assistant                            PT Assessment/Plan     Row Name 05/09/18 1000          PT Assessment    Assessment Comments good response to STM, Pt defers any standing leg exercise 2° causing pain in the back, no new goals met, Pt reports 35-40% improvement   -        PT Plan    PT Plan Comments recheck NV  -       User Key  (r) = Recorded By, (t) = Taken By, (c) = Cosigned By    Initials Name Provider Type    ISIS Tena PTA Physical Therapy Assistant                Modalities     Row Name 05/09/18 1000             Moist Heat    MH Applied Yes  -      Location lumbar/thoracic  -      Rx Minutes 15 mins  -      MH S/P Rx Yes  -        User Key  (r) = Recorded By, (t) = Taken By,  (c) = Cosigned By    Initials Name Provider Type     Karthikeyan Tena PTA Physical Therapy Assistant                Exercises     Row Name 05/09/18 1000             Subjective Comments    Subjective Comments Pt reports that her back is a constant pain  -         Subjective Pain    Able to rate subjective pain? yes  -JH      Pre-Treatment Pain Level 3  -JH      Post-Treatment Pain Level 0  -JH         Exercise 1    Exercise Name 1 PRO II for ROM/endurance  -      Time 1 8 min  -JH         Exercise 2    Exercise Name 2 trans ab  -JH      Sets 2 2  -JH      Reps 2 10  -JH         Exercise 3    Exercise Name 3 T Band hip Abd  -JH      Sets 3 2  -JH      Reps 3 10  -JH      Additional Comments green  -JH         Exercise 4    Exercise Name 4 hip abd  -JH      Sets 4 2  -JH      Reps 4 10  -JH         Exercise 5    Exercise Name 5 3 way clocks  -JH      Sets 5 2  -JH      Reps 5 10  -JH      Additional Comments red tb, supine horizontal abd  -JH         Exercise 6    Exercise Name 6 prone T Band hip IR  -JH      Sets 6 2  -JH      Reps 6 10  -JH      Additional Comments yellow  -JH         Exercise 7    Exercise Name 7 T Band rows  -JH      Sets 7 2  -JH      Reps 7 10  -JH         Exercise 8    Exercise Name 8 T Band shoulder ext  -JH      Sets 8 2  -JH      Reps 8 10  -JH        User Key  (r) = Recorded By, (t) = Taken By, (c) = Cosigned By    Initials Name Provider Type     Karthikeyan Tena PTA Physical Therapy Assistant                        Manual Rx (last 36 hours)      Manual Treatments     Row Name 05/09/18 1100             Manual Rx 1    Manual Rx 1 Location R UT & lumbar paraspinals  -      Manual Rx 1 Type STM/MFR  -JH      Manual Rx 1 Duration 8'  -JH        User Key  (r) = Recorded By, (t) = Taken By, (c) = Cosigned By    Initials Name Provider Type    ISIS Tena PTA Physical Therapy Assistant                PT OP Goals     Row Name 05/09/18 1100          PT Short Term Goals    STG Date to  Achieve 05/09/18  -     STG 1 IND and compliant with HEP  -     STG 1 Progress Ongoing  -     STG 2 Patient will improve Modified OSWESTRY to 32%  -     STG 2 Progress Ongoing  -     STG 3 Patient will demonstrate 3+/5 B hip strength in all planes  -     STG 3 Progress Ongoing  -     STG 4 Patient will be able to perform tandem stance on static surface 30 seconds  -     STG 4 Progress Ongoing  -     STG 5 Patient will have 8-10 sec SLS on static surface  -[facility] 5 Progress [facility]        Long Term Goals    LTG Date to Achieve 05/30/18  -     LTG 1 Patient will improve Modified OSWESTRY to les than 25%  -     LTG 1 Progress Ongoing  -     LTG 2 Patient will be able to perform light lifting 5-10# from floor to waist with good mechanics  -     LTG 2 Progress Ongoing  -     LTG 3 Patient will be able to tolerate 45 min treatment session of standcing activity with no greather than 2 point increase in pain on VAS.  -     LTG 3 Progress Ongoing  -     LTG 4 Patient will have 4/5 B hip strength in all planes   -     LTG 4 Progress Ongoing  -     LTG 5 Patient will be able to perform tandem balance on compliant surface 30 sec  -     LTG 5 Progress Ongoing  -     LTG 6 Patient will have LB flexiblity WNL bilaterally.   -     LTG 6 Progress [facility]        Time Calculation    PT Goal Re-Cert Due Date 05/09/18  -       User Key  (r) = Recorded By, (t) = Taken By, (c) = Cosigned By    Initials Name Provider Type     Karthikeyan Tena PTA Physical Therapy Assistant                         Time Calculation:   Start Time: 1015  Stop Time: 1116  Time Calculation (min): 61 min  PT Non-Billable Time (min): 15 min    Therapy Charges for Today     Code Description Service Date Service Provider Modifiers Qty    95257451861 HC PT THER PROC EA 15 MIN 5/9/2018 Karthikeyan Tena PTA GP 2    70480765811 HC PT MANUAL THERAPY EA 15 MIN 5/9/2018 Karthikeyan Tena PTA GP 1    47063722604 HC PT  THER SUPP EA 15 MIN 5/9/2018 Karthikeyan Tena PTA GP 1                    Karthikeyan Tena PTA  5/9/2018

## 2018-05-10 ENCOUNTER — HOSPITAL ENCOUNTER (OUTPATIENT)
Dept: PHYSICAL THERAPY | Facility: HOSPITAL | Age: 53
Setting detail: THERAPIES SERIES
Discharge: HOME | End: 2018-05-10

## 2018-05-10 DIAGNOSIS — G89.29 CHRONIC LOW BACK PAIN, UNSPECIFIED BACK PAIN LATERALITY, WITH SCIATICA PRESENCE UNSPECIFIED: Primary | ICD-10-CM

## 2018-05-10 DIAGNOSIS — M54.5 CHRONIC LOW BACK PAIN, UNSPECIFIED BACK PAIN LATERALITY, WITH SCIATICA PRESENCE UNSPECIFIED: Primary | ICD-10-CM

## 2018-05-10 PROCEDURE — 97140 MANUAL THERAPY 1/> REGIONS: CPT | Performed by: PHYSICAL THERAPIST

## 2018-05-10 PROCEDURE — 97110 THERAPEUTIC EXERCISES: CPT | Performed by: PHYSICAL THERAPIST

## 2018-05-10 NOTE — THERAPY PROGRESS REPORT/RE-CERT
Outpatient Physical Therapy Ortho Progress Note  [facility]     Patient Name: Shania Sweeney  : 1965  MRN: [id number]  Today's Date: 5/10/2018      Visit Date: 05/10/2018      Attendance    Authorized Medical necessity   Pre Rx pain 2   Post Rx pain 2   % improvement 50%   MD follow up TBD   Recert date            Visit Dx:    ICD-10-CM ICD-9-CM   1. 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   • Anxiety   • Neck pain   • Brachial neuritis   • Bronchitis   • Urinary frequency        Past Medical History:   Diagnosis Date   • Abnormal Pap smear of cervix    • Acute bronchitis     Improving   • Anxiety    • Asthma    • 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    • Chronic back pain     C spine surgery   • Chronic headache disorder    • Cough    • Degenerative joint disease involving multiple joints    • Depression    • Rossana-Danlos syndrome    • GERD (gastroesophageal reflux disease)    • Hip pain    • Hyperlipidemia     Unspecified   • Incontinence    • Insomnia    • Lumbosacral neuritis    • Multiple joint pain    • Neck pain    • Need for prophylactic vaccination and inoculation against influenza    • Obesity    • Pain in joint involving shoulder region     Left   • Rash and nonspecific skin eruption     Rash and other nonspecific skin eruption      • Spasm of back muscles    • Spasm of muscle         Past Surgical History:   Procedure Laterality Date   • APPENDECTOMY     • BARIATRIC SURGERY     • CERVICAL SPINE SURGERY N/A    • CHOLECYSTECTOMY     • EPIDURAL LEAD INSERTION N/A  10/27/2017    Procedure: INTERSTIM STAGES ONE AND TWO   (C-ARM AND C-ARM TABLE);  Surgeon: Farshad Metzger MD;  Location: [facility];  Service:    • FACIAL RECONSTRUCTION SURGERY N/A    • HYSTERECTOMY  2006    with BSO   • INJECTION OF MEDICATION  09/24/2015    Depo Medrol (Methylprednisone) 80mg    • INJECTION OF MEDICATION  03/19/2015    Toradol   • LUMBAR SPINE SURGERY N/A    • PROCEDURE GENERIC CONVERTED  05/24/2013    Nebulizer Treatment   • RECONSTRUCTION OF NOSE N/A    • SHOULDER ROTATOR CUFF REPAIR Right     x 2   • TONSILLECTOMY     • TRIGGER POINT INJECTION  03/02/2016    [facility] Pt Inj, sing/multi pts. 3/+ muscles              PT Ortho     Row Name 05/10/18 1000       Myotomal Screen- Lower Quarter Clearing    Hip flexion (L2) 5 (Normal)  -DD    Knee extension (L3) 5 (Normal)  -DD       Lumbar ROM Screen- Lower Quarter Clearing    Lumbar Flexion Normal  -DD    Lumbar Extension Impaired   5°  -DD    Lumbar Lateral Flexion Impaired   10° Demetrius  -DD       Left Hip (Manual Muscle Testing)    MMT, Left Hip: Manual Muscle Testing (MMT) 5/5 flexion, 4+ abd, 4 add, 4 ext  -DD       Right Hip (Manual Muscle Testing)    MMT, Right Hip: Manual Muscle Testing (MMT) 5/5 hip flexion, 4+ abd, ext 4-, add 4-  -DD       Left Knee (Manual Muscle Testing)    Comment, Left Knee: Manual Muscle Testing (MMT) 5/5 quad and 4+ hamstrings  -DD       Sensation    Sensation WNL? WNL  -DD    Light Touch No apparent deficits  -DD    Row Name 05/09/18 1000       Subjective Comments    Subjective Comments Pt reports that her back is a constant pain  -       Precautions and Contraindications    Precautions/Limitations spinal precautions  -    Precautions Hx lumbar fusion; 2 Implanted stimulators (i.e. NO estim, US, traction)  -       Subjective Pain    Able to rate subjective pain? yes  -    Pre-Treatment Pain Level 3  -    Post-Treatment Pain Level 0  -      User Key  (r) = Recorded By, (t) = Taken By, (c) = Cosigned By     Initials Name Provider Type    DD Michell Daugherty, PT Physical Therapist    ISIS Tena, IVETT Physical Therapy Assistant                            PT Assessment/Plan     Row Name 05/10/18 1109 05/10/18 1032       PT Assessment    Assessment Comments  -- Pt is showing good progress we will work toward core and standing activities as tolerates  -DD    Please refer to paper survey for additional self-reported information  -- Yes  -DD    Rehab Potential  -- Good  -DD    Patient/caregiver participated in establishment of treatment plan and goals  -- Yes  -DD    Patient would benefit from skilled therapy intervention  -- Yes  -DD       PT Plan    PT Frequency  -- 2x/week  -DD    Predicted Duration of Therapy Intervention (OT Eval)  -- 4 weeks  -DD    PT Plan Comments Core strengthening, lumbar stabilization.  -DD Progress as tolerates continue MFR and MH  -DD      User Key  (r) = Recorded By, (t) = Taken By, (c) = Cosigned By    Initials Name Provider Type    DD Michell Daugherty, PT Physical Therapist                Modalities     Row Name 05/10/18 1000             Moist Heat    Location lumbar/thoracic  -DD      Rx Minutes 15 mins  -DD      MH S/P Rx Yes  -DD        User Key  (r) = Recorded By, (t) = Taken By, (c) = Cosigned By    Initials Name Provider Type    DD Michell Daugherty, PT Physical Therapist                Exercises     Row Name 05/10/18 1000             Subjective Comments    Subjective Comments Sees 50% improvement with therapy.  -DD         Subjective Pain    Able to rate subjective pain? yes  -DD      Pre-Treatment Pain Level 2  -DD         Exercise 1    Exercise Name 1 PRO II for ROM/endurance  -DD      Time 1 10 min  -DD      Additional Comments L3  -DD         Exercise 2    Exercise Name 2 trans ab  -DD      Sets 2 2  -DD      Reps 2 10  -DD         Exercise 3    Exercise Name 3 T Band hip Abd  -DD      Sets 3 2  -DD      Reps 3 10  -DD      Additional Comments green  -DD          Exercise 4    Exercise Name 4 hip add  -DD      Sets 4 2  -DD      Reps 4 10  -DD         Exercise 5    Exercise Name 5  clocks horizontal  -DD      Sets 5 2  -DD      Reps 5 10  -DD      Additional Comments red tb, on alicia dysc  -DD         Exercise 6    Exercise Name 6 prone T Band hip IR  -DD      Sets 6 2  -DD      Reps 6 10  -DD      Additional Comments yellow  -DD         Exercise 7    Exercise Name 7 T Band rows  -DD      Sets 7 2  -DD      Reps 7 10  -DD      Additional Comments rec on alicia dysc  -DD         Exercise 8    Exercise Name 8 T Band shoulder ext  -DD      Sets 8 2  -DD      Reps 8 10  -DD      Additional Comments green  -DD         Exercise 9    Exercise Name 9 Lumbar ROM  -DD      Time 9 2'  -DD         Exercise 10    Exercise Name 10 isometreic resisted LEs hips  -DD      Time 10 3'  -DD        User Key  (r) = Recorded By, (t) = Taken By, (c) = Cosigned By    Initials Name Provider Type    DD Michell Daugherty, PT Physical Therapist                        Manual Rx (last 36 hours)      Manual Treatments     Row Name 05/10/18 1100 05/09/18 1100          Manual Rx 1    Manual Rx 1 Location Right paraspinal cupping/manual MFR/ Ut and lumbar area  -DD R UT & lumbar paraspinals  -JH     Manual Rx 1 Type  -- STM/MFR  -JH     Manual Rx 1 Duration 8'  -DD 8'  -       User Key  (r) = Recorded By, (t) = Taken By, (c) = Cosigned By    Initials Name Provider Type    DD Michell Duagherty, PT Physical Therapist    ISIS Tena PTA Physical Therapy Assistant                PT OP Goals     Row Name 05/10/18 1000          PT Short Term Goals    STG Date to Achieve 05/09/18  -DD     STG 1 IND and compliant with HEP  -DD     STG 1 Progress Progressing  -DD     STG 2 Patient will improve Modified OSWESTRY to 32%  -DD     STG 2 Progress Progressing  -DD     STG 2 Progress Comments 34%  -DD     STG 3 Patient will demonstrate 3+/5 B hip strength in all planes  -DD     STG 3 Progress Met  -DD     STG 4  Patient will be able to perform tandem stance on static surface 30 seconds  -DD     STG 4 Progress Met  -DD     STG 5 Patient will have 8-10 sec SLS on static surface  -DD     STG 5 Progress Met  -DD     STG 5 Progress Comments Right foot only, previous fracture in left foot  -DD        Long Term Goals    LTG Date to Achieve 05/30/18  -DD     LTG 1 Patient will improve Modified OSWESTRY to les than 25%  -DD     LTG 1 Progress Ongoing  -DD     LTG 1 Progress Comments 34%  -DD     LTG 2 Patient will be able to perform light lifting 5-10# from floor to waist with good mechanics  -DD     LTG 2 Progress Ongoing  -DD     LTG 3 Patient will be able to tolerate 45 min treatment session of standcing activity with no greather than 2 point increase in pain on VAS.  -DD     LTG 3 Progress Ongoing  -DD     LTG 4 Patient will have 4/5 B hip strength in all planes   -DD     LTG 4 Progress Partially Met;Progressing  -DD     LTG 5 Patient will be able to perform tandem balance on compliant surface 30 sec  -DD     LTG 5 Progress Ongoing  -DD     LTG 6 Patient will have LB flexiblity WNL bilaterally.   -DD     LTG 6 Progress Ongoing  -DD        Time Calculation    PT Goal Re-Cert Due Date 05/31/18  -DD       User Key  (r) = Recorded By, (t) = Taken By, (c) = Cosigned By    Initials Name Provider Type    DD Michell Daugherty, PT Physical Therapist               Outcome Measure Options: Modifed Owestry  Modified Oswestry  Modified Oswestry Score/Comments: 34%      Time Calculation:   Start Time: 1016  Stop Time: 1118  Time Calculation (min): 62 min  Total Timed Code Minutes- PT: 43 minute(s)    Therapy Charges for Today     Code Description Service Date Service Provider Modifiers Qty    59942371264 HC PT THER SUPP EA 15 MIN 5/10/2018 Michell Daugherty, PT GP 1    16489378344 HC PT THER PROC EA 15 MIN 5/10/2018 Michell Daugherty, PT GP 2    21695443217 HC PT MANUAL THERAPY EA 15 MIN 5/10/2018 Michell Daugherty, PT GP 1           PT G-Codes  Outcome Measure Options: Modifed Sunny Rowe-Shakira, PT, ATC, DPT  5/10/2018

## 2018-05-15 ENCOUNTER — HOSPITAL ENCOUNTER (OUTPATIENT)
Dept: PHYSICAL THERAPY | Facility: HOSPITAL | Age: 53
Setting detail: THERAPIES SERIES
End: 2018-05-15

## 2018-06-25 ENCOUNTER — DOCUMENTATION (OUTPATIENT)
Dept: PHYSICAL THERAPY | Facility: HOSPITAL | Age: 53
End: 2018-06-25

## 2019-01-02 ENCOUNTER — TRANSCRIBE ORDERS (OUTPATIENT)
Dept: ORTHOPEDIC SURGERY | Facility: CLINIC | Age: 54
End: 2019-01-02

## 2019-01-02 DIAGNOSIS — M79.641 RIGHT HAND PAIN: Primary | ICD-10-CM

## 2019-01-14 DIAGNOSIS — M79.641 RIGHT HAND PAIN: Primary | ICD-10-CM

## 2019-01-15 ENCOUNTER — OFFICE VISIT (OUTPATIENT)
Dept: ORTHOPEDIC SURGERY | Facility: CLINIC | Age: 54
End: 2019-01-15

## 2019-01-15 VITALS — WEIGHT: 290 LBS | HEIGHT: 71 IN | BODY MASS INDEX: 40.6 KG/M2

## 2019-01-15 DIAGNOSIS — M79.641 RIGHT HAND PAIN: ICD-10-CM

## 2019-01-15 DIAGNOSIS — Q79.60 EHLERS-DANLOS SYNDROME: ICD-10-CM

## 2019-01-15 DIAGNOSIS — S67.21XA CRUSH INJURY OF HAND, RIGHT, INITIAL ENCOUNTER: Primary | ICD-10-CM

## 2019-01-15 DIAGNOSIS — E66.01 MORBID OBESITY WITH BMI OF 40.0-44.9, ADULT (HCC): ICD-10-CM

## 2019-01-15 PROCEDURE — 99203 OFFICE O/P NEW LOW 30 MIN: CPT | Performed by: ORTHOPAEDIC SURGERY

## 2019-01-15 NOTE — PROGRESS NOTES
Shania Sweeney is a 53 y.o. female   Primary provider:  Charley Davis DO       Chief Complaint   Patient presents with   • Right Hand - Pain, Edema       HISTORY OF PRESENT ILLNESS: new patient with right hand pain, patient had xrays done today, patient closed the truck door on her fingers, shut truck door on fingers 7-8 weeks ago.  Still having pain in right hand.  Had stitches placed.   Went to urgent care a few days later for cellulitis.  Stitches were removed 2 weeks or so after injury.  Still having difficulty with hand motion   Cannot make a fist.  Limited in activity.  Has not done any therapy.      Hand Pain    Incident onset: 11/21/18. Injury mechanism: slammed the truck door on 4 fingers  The pain is present in the right hand. The quality of the pain is described as aching, burning and stabbing. The pain does not radiate. The pain is moderate. The pain has been constant since the incident. The symptoms are aggravated by movement. She has tried heat, ice, NSAIDs and rest for the symptoms. The treatment provided mild relief.        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    • Chronic back pain     C spine surgery   • Chronic headache disorder    • Cough    • Degenerative joint disease involving multiple joints    • Depression    • Rossana-Danlos syndrome    • GERD (gastroesophageal reflux disease)    • Hip pain    • Hyperlipidemia     Unspecified   • Incontinence    • Insomnia    • Lumbosacral neuritis    • Multiple joint pain    • Neck pain    • Need for prophylactic vaccination and inoculation against influenza    • Obesity    • Pain in joint involving shoulder region     Left   • Rash and nonspecific skin eruption     Rash and other nonspecific skin eruption      • Spasm of back muscles    • Spasm of muscle         Allergies   Allergen Reactions   • Ceftin [Cefuroxime Axetil] Rash   • Cefuroxime Rash   • Sulfa Antibiotics Rash         Current Outpatient Medications:   •  albuterol (PROVENTIL HFA;VENTOLIN HFA) 108 (90 Base) MCG/ACT inhaler, Inhale 2 puffs Every 4 (Four) Hours As Needed for Wheezing., Disp: , Rfl:   •  albuterol (PROVENTIL) (2.5 MG/3ML) 0.083% nebulizer solution, Take 2.5 mg by nebulization Every 4 (Four) Hours As Needed for Wheezing., Disp: 25 vial, Rfl: 3  •  ascorbic acid (VITAMIN C) 1000 MG tablet, Take 1,000 mg by mouth Daily., Disp: , Rfl:   •  azithromycin (ZITHROMAX) 250 MG tablet, Take 2 tablets the first day, then 1 tablet daily for 4 days., Disp: 6 tablet, Rfl: 0  •  baclofen (LIORESAL) 20 MG tablet, Take 1 tablet by mouth 4 (Four) Times a Day., Disp: 360 tablet, Rfl: 1  •  butalbital-acetaminophen-caffeine (FIORICET, ESGIC) -40 MG per tablet, Take 1 tablet by mouth Every 4 (Four) Hours As Needed for Headache., Disp: , Rfl:   •  calcium citrate-vitamin d (CALCITRATE) 315-250 MG-UNIT tablet tablet, Take 1 tablet by mouth Daily., Disp: , Rfl:   •  cetirizine (zyrTEC) 10 MG tablet, TAKE 1 TABLET BY MOUTH DAILY, Disp: 30 tablet, Rfl: 0  •  clonazePAM (KlonoPIN) 1 MG tablet, Take 1 tablet by mouth 3 (Three) Times a Day As Needed for Anxiety or Seizures., Disp: 90 tablet, Rfl: 0  •  conjugated estrogens (PREMARIN) 0.625 MG/GM vaginal cream, Insert 1 gram vaginally twice weekly, Disp: 90 g, Rfl: 3  •  estrogens, conjugated, (PREMARIN) 1.25 MG tablet, Take 1 tablet by mouth Daily., Disp: 90 tablet, Rfl: 1  •  fluticasone (FLONASE) 50 MCG/ACT nasal spray, 1 spray into each nostril 2 (Two) Times a Day., Disp: 3 bottle, Rfl: 3  •  gabapentin (NEURONTIN) 800 MG tablet, Take 1 tablet by mouth 3 (Three) Times a Day., Disp: 270 tablet, Rfl: 0  •  HYDROcodone-acetaminophen (NORCO)  MG per tablet, Take 1/2 to 1 tablet by mouth every 6 hours as needed for pain, Disp: 120 tablet, Rfl: 0  •  KRILL OIL  PO, Take 1 capsule by mouth 2 (two) times a day. Krill Oil 1,000 mg-170 mg-50 mg-80 mg capsule, Disp: , Rfl:   •  meloxicam (MOBIC) 15 MG tablet, Take 1 tablet by mouth Daily., Disp: 90 tablet, Rfl: 3  •  montelukast (SINGULAIR) 10 MG tablet, Take 1 tablet by mouth Every Night. Take 1 po  hs, Disp: 90 tablet, Rfl: 3  •  Multiple Vitamins-Minerals (ICAPS) capsule, Take 1 tablet by mouth Daily., Disp: , Rfl:   •  omeprazole (priLOSEC) 40 MG capsule, Take 1 capsule by mouth Daily., Disp: 90 capsule, Rfl: 3  •  oseltamivir (TAMIFLU) 75 MG capsule, Take 1 capsule by mouth 2 (Two) Times a Day., Disp: 10 capsule, Rfl: 0  •  QUEtiapine (SEROQUEL) 50 MG tablet, Take 1 tablet by mouth Every Night., Disp: 30 tablet, Rfl: 5  •  temazepam (RESTORIL) 30 MG capsule, Take 1 capsule by mouth At Night As Needed for Sleep., Disp: 30 capsule, Rfl: 0    Past Surgical History:   Procedure Laterality Date   • APPENDECTOMY     • BARIATRIC SURGERY     • CERVICAL SPINE SURGERY N/A    • CHOLECYSTECTOMY     • EPIDURAL LEAD INSERTION N/A 10/27/2017    Procedure: INTERSTIM STAGES ONE AND TWO   (C-ARM AND C-ARM TABLE);  Surgeon: Farshad Metzger MD;  Location: [facility];  Service:    • FACIAL RECONSTRUCTION SURGERY N/A    • HYSTERECTOMY  2006    with BSO   • INJECTION OF MEDICATION  09/24/2015    Depo Medrol (Methylprednisone) 80mg    • INJECTION OF MEDICATION  03/19/2015    Toradol   • LUMBAR SPINE SURGERY N/A    • PROCEDURE GENERIC CONVERTED  05/24/2013    Nebulizer Treatment   • RECONSTRUCTION OF NOSE N/A    • SHOULDER ROTATOR CUFF REPAIR Right     x 2   • TONSILLECTOMY     • TRIGGER POINT INJECTION  03/02/2016    [place] Pt Inj, sing/multi pts. 3/+ muscles        Family History   Problem Relation Age of Onset   • Diabetes Mother    • Hyperlipidemia Mother    • Stroke Mother         Social History     Socioeconomic History   • Marital status:      Spouse name: Not on file   • Number of children: Not on file   • Years of education: Not on  "file   • Highest education level: Not on file   Social Needs   • Financial resource strain: Not on file   • Food insecurity - worry: Not on file   • Food insecurity - inability: Not on file   • Transportation needs - medical: Not on file   • Transportation needs - non-medical: Not on file   Occupational History   • Not on file   Tobacco Use   • Smoking status: Never Smoker   • Smokeless tobacco: Never Used   Substance and Sexual Activity   • Alcohol use: Yes     Comment: OCCASIONALLY   • Drug use: No   • Sexual activity: Defer   Other Topics Concern   • Not on file   Social History Narrative   • Not on file        Review of Systems   Constitutional: Negative for chills and fever.   Respiratory: Negative.    Cardiovascular: Negative.    Gastrointestinal: Negative.    Musculoskeletal: Positive for joint swelling.        Right hand pain and swelling   All other systems reviewed and are negative.      PHYSICAL EXAMINATION:       Ht 180.3 cm (71\")   Wt 132 kg (290 lb)   BMI 40.45 kg/m²     Physical Exam   Constitutional: She is oriented to person, place, and time. She appears well-developed and well-nourished.   Neurological: She is alert and oriented to person, place, and time.   Psychiatric: She has a normal mood and affect. Her behavior is normal. Judgment and thought content normal.       GAIT:     []  Normal  [x]  Antalgic    Assistive device: [x]  None  []  Walker     []  Crutches  []  Cane     []  Wheelchair  []  Stretcher    Right Hand Exam     Other   Erythema: absent  Sensation: normal  Pulse: present    Comments:  No specific tenderness on exam.  She is unable to make a full fist.  She has MCP flexion of the second third and fourth fingers to about 60°.  She has PIP flexion at about 90° of the second third and fourth fingers.  Skin is shiny.  She has somewhat limited  strength due to her inability to make a full fist.      Left Hand Exam     Tenderness   The patient is experiencing no tenderness. " "    Range of Motion   The patient has normal left wrist ROM.    Muscle Strength   The patient has normal left wrist strength.    Other   Erythema: absent  Sensation: normal  Pulse: present              Xr Hand 3+ View Right    Result Date: 1/15/2019  Narrative: Ordering Provider:  Farshad Kiser MD Ordering Diagnosis/Indication:  Right hand pain Procedure:  XR HAND 3+ VW RIGHT Exam Date:  1/15/19 COMPARISON:  Not applicable, no relevant images available.     Impression:  3 views of the right hand show acceptable position and alignment with no evidence of acute bony abnormality.  No fracture or dislocation is noted. Farshad Kiser MD 1/15/19           ASSESSMENT:    Diagnoses and all orders for this visit:    Crush injury of hand, right, initial encounter  -     Ambulatory Referral to Physical Therapy Evaluate and treat (ROM, 1 x per week teach HEP,  Crush injury Right hand)    Right hand pain  -     Ambulatory Referral to Physical Therapy Evaluate and treat (ROM, 1 x per week teach HEP,  Crush injury Right hand)    Rossana-Danlos syndrome    Morbid obesity with BMI of 40.0-44.9, adult (CMS/[facility])          PLAN     She will begin physical therapy for range of motion and strengthening exercises motion and improvement in function.  She has some findings on exam consistent with complex regional pain syndrome or reflex synthetic dystrophy.  She'll continue with general motion and strengthening exercises.  We discussed that there was not a specific \"fix\" at this point other than continued time, motion, and function.    Patient's Body mass index is 40.45 kg/m². BMI is above normal parameters. Recommendations include: exercise counseling and nutrition counseling.    Return in about 4 weeks (around 2/12/2019) for recheck.    Farshad Kiser MD    "

## 2019-01-16 ENCOUNTER — HOSPITAL ENCOUNTER (OUTPATIENT)
Dept: PHYSICAL THERAPY | Facility: HOSPITAL | Age: 54
Setting detail: THERAPIES SERIES
Discharge: HOME | End: 2019-01-16

## 2019-01-16 DIAGNOSIS — M79.641 RIGHT HAND PAIN: Primary | ICD-10-CM

## 2019-01-16 DIAGNOSIS — S67.21XA CRUSH INJURY OF HAND, RIGHT, INITIAL ENCOUNTER: ICD-10-CM

## 2019-01-16 PROCEDURE — 97162 PT EVAL MOD COMPLEX 30 MIN: CPT | Performed by: PHYSICAL THERAPIST

## 2019-01-16 PROCEDURE — 97110 THERAPEUTIC EXERCISES: CPT | Performed by: PHYSICAL THERAPIST

## 2019-01-16 NOTE — THERAPY EVALUATION
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    • Chronic back pain     C spine surgery   • Chronic headache disorder    • Cough    • Degenerative joint disease involving multiple joints    • Depression    • Rossana-Danlos syndrome    • GERD (gastroesophageal reflux disease)    • Hip pain    • Hyperlipidemia     Unspecified   • Incontinence    • Insomnia    • Lumbosacral neuritis    • Multiple joint pain    • Neck pain    • Need for prophylactic vaccination and inoculation against influenza    • Obesity    • Pain in joint involving shoulder region     Left   • Rash and nonspecific skin eruption     Rash and other nonspecific skin eruption      • Spasm of back muscles    • Spasm of muscle         Past Surgical History:   Procedure Laterality Date   • APPENDECTOMY     • BARIATRIC SURGERY     • CERVICAL SPINE SURGERY N/A    • CHOLECYSTECTOMY     • EPIDURAL LEAD INSERTION N/A 10/27/2017    Procedure: INTERSTIM STAGES ONE AND TWO   (C-ARM AND C-ARM TABLE);  Surgeon: Farshad Metzger MD;  Location: [facility];  Service:    • FACIAL RECONSTRUCTION SURGERY N/A    • HYSTERECTOMY  2006    with BSO   • INJECTION OF MEDICATION  2015    Depo Medrol (Methylprednisone) 80mg    • INJECTION OF MEDICATION  2015    Toradol   • LUMBAR SPINE SURGERY N/A    • PROCEDURE GENERIC CONVERTED  2013    Nebulizer Treatment   • RECONSTRUCTION OF NOSE N/A    • SHOULDER ROTATOR CUFF REPAIR Right     x 2   • TONSILLECTOMY     • TRIGGER POINT  INJECTION  03/02/2016    [place] Pt Inj, sing/multi pts. 3/+ muscles        Current Outpatient Medications:   •  albuterol (PROVENTIL HFA;VENTOLIN HFA) 108 (90 Base) MCG/ACT inhaler, Inhale 2 puffs Every 4 (Four) Hours As Needed for Wheezing., Disp: , Rfl:   •  albuterol (PROVENTIL) (2.5 MG/3ML) 0.083% nebulizer solution, Take 2.5 mg by nebulization Every 4 (Four) Hours As Needed for Wheezing., Disp: 25 vial, Rfl: 3  •  ascorbic acid (VITAMIN C) 1000 MG tablet, Take 1,000 mg by mouth Daily., Disp: , Rfl:   •  azithromycin (ZITHROMAX) 250 MG tablet, Take 2 tablets the first day, then 1 tablet daily for 4 days., Disp: 6 tablet, Rfl: 0  •  baclofen (LIORESAL) 20 MG tablet, Take 1 tablet by mouth 4 (Four) Times a Day., Disp: 360 tablet, Rfl: 1  •  butalbital-acetaminophen-caffeine (FIORICET, ESGIC) -40 MG per tablet, Take 1 tablet by mouth Every 4 (Four) Hours As Needed for Headache., Disp: , Rfl:   •  calcium citrate-vitamin d (CALCITRATE) 315-250 MG-UNIT tablet tablet, Take 1 tablet by mouth Daily., Disp: , Rfl:   •  cetirizine (zyrTEC) 10 MG tablet, TAKE 1 TABLET BY MOUTH DAILY, Disp: 30 tablet, Rfl: 0  •  clonazePAM (KlonoPIN) 1 MG tablet, Take 1 tablet by mouth 3 (Three) Times a Day As Needed for Anxiety or Seizures., Disp: 90 tablet, Rfl: 0  •  conjugated estrogens (PREMARIN) 0.625 MG/GM vaginal cream, Insert 1 gram vaginally twice weekly, Disp: 90 g, Rfl: 3  •  estrogens, conjugated, (PREMARIN) 1.25 MG tablet, Take 1 tablet by mouth Daily., Disp: 90 tablet, Rfl: 1  •  fluticasone (FLONASE) 50 MCG/ACT nasal spray, 1 spray into each nostril 2 (Two) Times a Day., Disp: 3 bottle, Rfl: 3  •  gabapentin (NEURONTIN) 800 MG tablet, Take 1 tablet by mouth 3 (Three) Times a Day., Disp: 270 tablet, Rfl: 0  •  HYDROcodone-acetaminophen (NORCO)  MG per tablet, Take 1/2 to 1 tablet by mouth every 6 hours as needed for pain, Disp: 120 tablet, Rfl: 0  •  KRILL OIL PO, Take 1 capsule by mouth 2 (two) times a day. Krill  Oil 1,000 mg-170 mg-50 mg-80 mg capsule, Disp: , Rfl:   •  meloxicam (MOBIC) 15 MG tablet, Take 1 tablet by mouth Daily., Disp: 90 tablet, Rfl: 3  •  montelukast (SINGULAIR) 10 MG tablet, Take 1 tablet by mouth Every Night. Take 1 po  hs, Disp: 90 tablet, Rfl: 3  •  Multiple Vitamins-Minerals (ICAPS) capsule, Take 1 tablet by mouth Daily., Disp: , Rfl:   •  omeprazole (priLOSEC) 40 MG capsule, Take 1 capsule by mouth Daily., Disp: 90 capsule, Rfl: 3  •  oseltamivir (TAMIFLU) 75 MG capsule, Take 1 capsule by mouth 2 (Two) Times a Day., Disp: 10 capsule, Rfl: 0  •  QUEtiapine (SEROQUEL) 50 MG tablet, Take 1 tablet by mouth Every Night., Disp: 30 tablet, Rfl: 5  •  temazepam (RESTORIL) 30 MG capsule, Take 1 capsule by mouth At Night As Needed for Sleep., Disp: 30 capsule, Rfl: 0    Allergies   Allergen Reactions   • Ceftin [Cefuroxime Axetil] Rash   • Cefuroxime Rash   • Sulfa Antibiotics Rash       Visit Dx:    ICD-10-CM ICD-9-CM   1. Right hand pain M79.641 729.5   2. Crush injury of hand, right, initial encounter S67.21XA 927.20       Patient History     Row Name 01/16/19 1000          Chief Complaint  Pain;Joint stiffness  -    Type of Pain  Hand pain  -    Date Current Problem(s) Began  11/21/18  -    Brief Description of Current Complaint  Patient was injured when she shut her right hand in a pickup truck door. Door completely closed on her hand. Hand was caught in door for a couple of minutes. Lacerated IF, MF, RF, and SF that required sutures. Was sutured at [facility]. Went to [facility] less than 48 hours later due to possible infection. Had 2 rounds of antibiotics. Currently having hand pain that occasionally radiates up to elbow, paraesthesia in the right hand, and difficulty moving the right hand. Has been working with rice for desensitization, edema glove, and silicone gel for scar by her daughter who is a AL in the hand clinic at [facility].   -SS    Patient/Caregiver  "Goals  Improve strength \"I want to be able to tie a fishing hook on.\"   -SS    Current Tobacco Use  no  -SS    Smoking Status  never  -SS    Patient's Rating of General Health  Very good  -SS    Hand Dominance  right-handed  -SS    Occupation/sports/leisure activities  Disabled. Hobbies: fishing, boating, grandchildren, \"anything outside\"  -SS    What clinical tests have you had for this problem?  X-ray  -SS    Results of Clinical Tests  no fracture  -SS          Pain Location  Hand right  -SS    Pain at Present  0  -SS    Pain at Best  0 over past 1 month  -SS    Pain at Worst  8;9 over past 1 month  -SS    Pain Frequency  Intermittent  -SS    Pain Description  Burning;Sharp  -SS    What Performance Factors Make the Current Problem(s) WORSE?  gripping, deflection of the fingers, pressure on hand  -SS    What Performance Factors Make the Current Problem(s) BETTER?  rest/\"doing nothing\"  -SS    Is your sleep disturbed?  Yes  -SS    Is medication used to assist with sleep?  Yes  -SS    Difficulties at work?  n/a  -SS    Difficulties with ADL's?  pain, decreased dexterity  -SS    Difficulties with recreational activities?  pain, decreased dexterity  -SS          Any falls in the past year:  Yes  -SS    Number of falls reported in the last 12 months  1  -SS    Factors that contributed to the fall:  Lost balance  -SS    Does patient have a fear of falling  Yes (comment) \"absolutely\"  -SS          Primary Language  English  -SS          Are you being hurt, hit, or frightened by anyone at home or in your life?  No  -SS    Are you being neglected by a caregiver  No  -SS      User Key  (r) = Recorded By, (t) = Taken By, (c) = Cosigned By    Initials Name Provider Type     Fred Cormier, PT DPT Physical Therapist             Hand Therapy (last 24 hours)      Hand Eval     Row Name 01/16/19 1000          Hand ROM Tested?  Right Extension- AROM;Right Flexion- AROM;Right Extension- PROM  -SS          II- MP AROM  0  " -SS    II- PIP AROM  -15  -SS    II- DIP AROM  15  -SS    II- BOOKER Right Extension AROM  0  -SS    III- MP AROM  -10  -SS    III- PIP AROM  -20 pain  -SS    III- DIP AROM  10  -SS    III- BOOKER Right Extension AROM  -20  -SS    IV- MP AROM  0  -SS    IV- PIP AROM  -10  -SS    IV- DIP AROM  5  -SS    IV- BOOKER Right Extension AROM  -5  -SS    V- MP AROM  15  -SS    V- PIP AROM  -15  -SS    V- DIP AROM  5  -SS    V- BOOKER Right Extension AROM  5  -SS          II- PIP PROM  0  -SS    III- PIP PROM  -15  -SS    IV- PIP PROM  0  -SS    V- PIP PROM  0  -SS          II- MP AROM  85  -SS    II- PIP AROM  100  -SS    II- DIP AROM  40  -SS    II- BOOKER Right Flexion AROM  225  -SS    III- MP AROM  85  -SS    III- PIP AROM  95  -SS    III- DIP AROM  60  -SS    III- BOOKER Right Flexion AROM  240  -SS    IV- MP AROM  75  -SS    IV- PIP AROM  100  -SS    IV- DIP AROM  80  -SS    IV- BOOKER Right Flexion AROM  255  -SS    V- MP AROM  75  -SS    V- PIP AROM  95  -SS    V- DIP AROM  80  -SS    V- BOOKER Right Flexion AROM  250  -SS           Strength Affected Side  Bilateral  -SS          # Reps  1  -SS    Right Rung  2  -SS    Right  Test 1  10  -SS     Strength Average Right  10  -SS          # Reps  1  -SS    Left Rung  2  -SS    Left  Test 1  60  -SS     Strength Average Left  60  -SS      User Key  (r) = Recorded By, (t) = Taken By, (c) = Cosigned By    Initials Name Provider Type     Fred Cormier, PT DPT Physical Therapist        PT Ortho     Row Name 01/16/19 1000       Subjective Comments    Subjective Comments  see Therapy Patient History  -SS       Precautions and Contraindications    Precautions  Rossana-Danlos Type 3, cervical and lumbar fusions  -SS       Subjective Pain    Able to rate subjective pain?  yes  -SS    Pre-Treatment Pain Level  0  -SS    Post-Treatment Pain Level  -- not rated  -SS       Posture/Observations    Posture/Observations Comments  Hypertrophic scarring radial IF PIP, radial  proximal phalanges of MF/RF/SF dorsally, and volar MF just proximal to PIP flexor crease. Systemically hypermobile.  -SS       Right Upper Ext    Rt Wrist Flexion AROM  80; pain dorsal hand  -SS    Rt Wrist Extension AROM  70 with fingers relaxed; 77 with fingers extended; pain in dorsal hand when fingers extended  -SS    Rt  Ulnar Deviation AROM  27  -SS    Rt  Radial Deviation AROM  20; pain FCR tendon insertion  -SS       Right Fingers    RT FINGERS COMMENTS  see Hand Eval for ROM measurements; pain with composite flexion of IF, MF, RF.   -      User Key  (r) = Recorded By, (t) = Taken By, (c) = Cosigned By    Initials Name Provider Type    Fred Evans, PT DPT Physical Therapist                    Therapy Education  Education Details: LLPS pressdown extension, tabletop, claw, fist, in-and-out  Given: HEP  Program: New  How Provided: Verbal, Demonstration, Written  Provided to: Patient  Level of Understanding: Verbalized, Demonstrated    PT OP Goals     Row Name 01/16/19 1000          STG Date to Achieve  02/06/19  -    STG 1  Note a >/= 25% subjective improvement.  -    STG 2  QuickDASH score to be </= 50.  -    STG 3  R  strength to be >/= 25#.  -    STG 4  Increase R IF DIP flexion to >/= 60 deg.  -          LTG Date to Achieve  03/13/19  -    LTG 1  Independent with HEP.  -    LTG 2  QuickDASH score to be </= 25.  -    LTG 3  Demonstrate full digital AROM without pain.  -    LTG 4  R  strength to be >/= 60#.  -    LTG 5  Report ability to tie fishing line.  -          PT Goal Re-Cert Due Date  02/06/19  -      User Key  (r) = Recorded By, (t) = Taken By, (c) = Cosigned By    Initials Name Provider Type    Fred Evans, PT DPT Physical Therapist          PT Assessment/Plan     Row Name 01/16/19 1000          Functional Limitations  Limitation in home management;Limitations in community activities;Limitations in functional capacity and  performance;Performance in leisure activities;Performance in self-care ADL  -    Impairments  Pain;Range of motion;Muscle strength;Dexterity  -    Assessment Comments  Patient is 8 weeks s/p crush injury to the right hand. Patient is guarded of the hand. She has motor dyskinesia of the hand.   -    Rehab Potential  Good barrier: delay from injury to initiation of P.T., Rossana-Danlos  -    Patient/caregiver participated in establishment of treatment plan and goals  Yes  -SS    Patient would benefit from skilled therapy intervention  Yes  -SS          PT Frequency  1x/week  -    Predicted Duration of Therapy Intervention (Therapy Eval)  4-6 weeks  -    PT Plan Comments  Fluidotherapy, ROM, stretching, strengthening, desensitization, scar massage, possible US to IF, tendon glides, nerve glides. Next: individual finger flexor stretch and scar massage  -      User Key  (r) = Recorded By, (t) = Taken By, (c) = Cosigned By    Initials Name Provider Type     Fred Cormier, PT DPT Physical Therapist            Exercises     Row Name 01/16/19 1000          Existing Precautions/Restrictions  -- Rossana-Danlos Type 3, cervical and lumbar fusions  -          Subjective Comments  see Therapy Patient History  -          Able to rate subjective pain?  yes  -    Pre-Treatment Pain Level  0  -SS    Post-Treatment Pain Level  -- not rated  -          Exercise Name 1  Fluidotherapy with AROM  -SS    Cueing 1  Verbal  -SS    Time 1  15 mins  -SS          Exercise Name 2  LLPS PIP extension pressdown  -SS    Cueing 2  Verbal;Tactile  -SS    Time 2  2 min hold  -SS          Exercise Name 3  Tabletop  -SS    Cueing 3  Verbal;Demo  -SS    Sets 3  1  -SS    Reps 3  15  -SS          Exercise Name 4  Claw  -SS    Cueing 4  Verbal;Demo  -SS    Sets 4  1  -SS    Reps 4  15  -SS          Exercise Name 5  Fist  -SS    Cueing 5  Verbal;Demo  -SS    Sets 5  1  -SS    Reps 5  15  -SS          Exercise Name 6  In and  Out  -SS    Cueing 6  Verbal;Demo  -SS    Sets 6  1  -SS    Reps 6  15  -SS      User Key  (r) = Recorded By, (t) = Taken By, (c) = Cosigned By    Initials Name Provider Type    Fred Evans, PT DPT Physical Therapist                       Outcome Measure Options: Quick DASH  Quick DASH  Open a tight or new jar.: Unable  Do heavy household chores (e.g., wash walls, wash floors): Severe Difficulty  Carry a shopping bag or briefcase: Unable  Wash your back: No Difficulty  Use a knife to cut food: Severe Difficulty  Recreational activities in which you take some force or impact through your arm, should or hand (e.g. golf, hammering, tennis, etc.): Severe Difficulty  During the past week, to what extent has your arm, shoulder, or hand problem interfered with your normal social activities with family, friends, neighbors or groups?: Quite a bit  During the past week, were you limited in your work or other regular daily activities as a result of your arm, shoulder or hand problem?: Very limited  Arm, Shoulder, or hand pain: Moderate  Tingling (pins and needles) in your arm, shoulder, or hand: Severe  During the past week, how much difficulty have you had sleeping because of the pain in your arm, shoulder or hand?: Mild Difficulty  Number of Questions Answered: 11  Quick DASH Score: 65.91         Time Calculation:   Start Time: 1014  Stop Time: 1115  Time Calculation (min): 61 min  Total Timed Code Minutes- PT: 20 minute(s)     Therapy Charges for Today     Code Description Service Date Service Provider Modifiers Qty    83646508975 HC PT EVAL MOD COMPLEXITY 3 1/16/2019 Fred Cormier, PT DPT GP 1    47995044688 HC PT THER PROC EA 15 MIN 1/16/2019 Fred Cormier, PT DPT GP 1                   Fred Cormier, PT, DPT, CHT  1/16/2019

## 2019-01-22 ENCOUNTER — HOSPITAL ENCOUNTER (OUTPATIENT)
Dept: PHYSICAL THERAPY | Facility: HOSPITAL | Age: 54
Setting detail: THERAPIES SERIES
Discharge: HOME | End: 2019-01-22

## 2019-01-22 DIAGNOSIS — M79.641 RIGHT HAND PAIN: Primary | ICD-10-CM

## 2019-01-22 DIAGNOSIS — S67.21XA CRUSH INJURY OF HAND, RIGHT, INITIAL ENCOUNTER: ICD-10-CM

## 2019-01-22 PROCEDURE — 97110 THERAPEUTIC EXERCISES: CPT

## 2019-01-22 NOTE — THERAPY TREATMENT NOTE
Outpatient Physical Therapy Ortho Treatment Note  [facility]     Patient Name: Shania Sweeney  : 1965  MRN: [id number]  Today's Date: 2019      Visit Date: 2019    Subjective Improvement 0  Visits 2/2  Visits approved medicare cap  RTMD 2019  Recert Geoff 2019    Right crushing hand injury    Visit Dx:    ICD-10-CM ICD-9-CM   1. Right hand pain M79.641 729.5   2. 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   • Anxiety   • Neck pain   • Brachial neuritis   • Bronchitis   • Urinary frequency   • Right hand pain        Past Medical History:   Diagnosis Date   • Abnormal Pap smear of cervix    • Acute bronchitis     Improving   • Anxiety    • Asthma    • 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    • Chronic back pain     C spine surgery   • Chronic headache disorder    • Cough    • Degenerative joint disease involving multiple joints    • Depression    • Rossana-Danlos syndrome    • GERD (gastroesophageal reflux disease)    • Hip pain    • Hyperlipidemia     Unspecified   • Incontinence    • Insomnia    • Lumbosacral neuritis    • Multiple joint pain    • Neck pain    • Need for prophylactic vaccination and inoculation against influenza    • Obesity    • Pain in joint involving shoulder region     Left   • Rash and nonspecific skin eruption     Rash and other nonspecific skin eruption      • Spasm of back muscles    • Spasm of muscle         Past Surgical History:   Procedure Laterality Date   • APPENDECTOMY     • BARIATRIC SURGERY     • CERVICAL SPINE SURGERY N/A    • CHOLECYSTECTOMY     • EPIDURAL LEAD INSERTION N/A 10/27/2017     Procedure: INTERSTIM STAGES ONE AND TWO   (C-ARM AND C-ARM TABLE);  Surgeon: Farshad Metzger MD;  Location: [facility];  Service:    • FACIAL RECONSTRUCTION SURGERY N/A    • HYSTERECTOMY  2006    with BSO   • INJECTION OF MEDICATION  09/24/2015    Depo Medrol (Methylprednisone) 80mg    • INJECTION OF MEDICATION  03/19/2015    Toradol   • LUMBAR SPINE SURGERY N/A    • PROCEDURE GENERIC CONVERTED  05/24/2013    Nebulizer Treatment   • RECONSTRUCTION OF NOSE N/A    • SHOULDER ROTATOR CUFF REPAIR Right     x 2   • TONSILLECTOMY     • TRIGGER POINT INJECTION  03/02/2016    [place] Pt Inj, sing/multi pts. 3/+ muscles                        PT Assessment/Plan     Row Name 01/22/19 1624          PT Assessment    Assessment Comments  Patient is compliant with HEP.  Patients daughter has supplied her with many HEP activities  -CP        PT Plan    PT Frequency  1x/week  -CP     Predicted Duration of Therapy Intervention (Therapy Eval)  4 weeks  -CP     PT Plan Comments  Cont wit poc.  monitor response to HEP.  possible us for scar release  -CP       User Key  (r) = Recorded By, (t) = Taken By, (c) = Cosigned By    Initials Name Provider Type    CP Romy Brennan, PTA Physical Therapy Assistant              Exercises     Row Name 01/22/19 1500             Subjective Comments    Subjective Comments  Patients daughter is a AL and she has been given HEP by her daughter.  -CP         Subjective Pain    Able to rate subjective pain?  yes  -CP      Pre-Treatment Pain Level  3  -CP      Post-Treatment Pain Level  3  -CP      Subjective Pain Comment  pain with use  -CP         Exercise 1    Exercise Name 1  fluidotherapy  -CP      Time 1  15  -CP         Exercise 2    Exercise Name 2  scar massage  -CP      Time 2  20  -CP      Additional Comments  each digit  -CP         Exercise 3    Exercise Name 3  LLPS fl stretch  -CP      Sets 3  2  -CP      Time 3  1  -CP      Additional Comments  each digit jt  -CP         Exercise 4     Exercise Name 4  LLPS composite fist stretch  -CP      Sets 4  3  -CP      Time 4  1  -CP         Exercise 5    Exercise Name 5  instruct in coban composite fist stretch  -CP         Exercise 6    Exercise Name 6  composite  with putty  -CP      Reps 6  20  -CP      Time 6  double yellow putty  -CP        User Key  (r) = Recorded By, (t) = Taken By, (c) = Cosigned By    Initials Name Provider Type    Romy Arellano, IVETT Physical Therapy Assistant                         PT OP Goals     Row Name 01/22/19 1600          PT Short Term Goals    STG Date to Achieve  02/06/19  -CP     STG 1  Note a >/= 25% subjective improvement.  -CP     STG 1 Progress  Not Met  -CP     STG 2  QuickDASH score to be </= 50.  -CP     STG 2 Progress  Not Met  -CP     STG 3  R  strength to be >/= 25#.  -CP     STG 3 Progress  Progressing  -CP     STG 4  Increase R IF DIP flexion to >/= 60 deg.  -CP     STG 4 Progress  Progressing  -CP        Long Term Goals    LTG Date to Achieve  03/13/19  -CP     LTG 1  Independent with HEP.  -CP     LTG 1 Progress  Ongoing  -CP     LTG 2  QuickDASH score to be </= 25.  -CP     LTG 2 Progress  Not Met  -CP     LTG 3  Demonstrate full digital AROM without pain.  -CP     LTG 3 Progress  Progressing  -CP     LTG 4  R  strength to be >/= 60#.  -CP     LTG 4 Progress  Progressing  -CP     LTG 5  Report ability to tie fishing line.  -CP     LTG 5 Progress  Not Met  -CP        Time Calculation    PT Goal Re-Cert Due Date  02/06/19  -CP       User Key  (r) = Recorded By, (t) = Taken By, (c) = Cosigned By    Initials Name Provider Type    Romy Arellano PTA Physical Therapy Assistant          Therapy Education  Education Details: coban composite  stretch, composite fist with double yellow putty  Given: HEP  Program: New  How Provided: Verbal, Demonstration  Provided to: Patient  Level of Understanding: Teach back education performed, Verbalized, Demonstrated              Time  Calculation:   Start Time: 1515  Stop Time: 1600  Time Calculation (min): 45 min  Total Timed Code Minutes- PT: 45 minute(s)  Therapy Suggested Charges     Code   Minutes Charges    None           Therapy Charges for Today     Code Description Service Date Service Provider Modifiers Qty    23415390823 HC PT THER PROC EA 15 MIN 1/22/2019 Romy Brennan, PTA GP 3                    Romy Brennan PTA  1/22/2019

## 2019-01-29 ENCOUNTER — APPOINTMENT (OUTPATIENT)
Dept: PHYSICAL THERAPY | Facility: HOSPITAL | Age: 54
End: 2019-01-29

## 2019-01-31 ENCOUNTER — HOSPITAL ENCOUNTER (OUTPATIENT)
Dept: PHYSICAL THERAPY | Facility: HOSPITAL | Age: 54
Setting detail: THERAPIES SERIES
Discharge: HOME | End: 2019-01-31

## 2019-01-31 DIAGNOSIS — M79.641 RIGHT HAND PAIN: Primary | ICD-10-CM

## 2019-01-31 DIAGNOSIS — S67.21XA CRUSH INJURY OF HAND, RIGHT, INITIAL ENCOUNTER: ICD-10-CM

## 2019-01-31 PROCEDURE — 97110 THERAPEUTIC EXERCISES: CPT

## 2019-01-31 PROCEDURE — 97140 MANUAL THERAPY 1/> REGIONS: CPT

## 2019-02-05 ENCOUNTER — HOSPITAL ENCOUNTER (OUTPATIENT)
Dept: PHYSICAL THERAPY | Facility: HOSPITAL | Age: 54
Setting detail: THERAPIES SERIES
Discharge: HOME | End: 2019-02-05

## 2019-02-05 DIAGNOSIS — M79.641 RIGHT HAND PAIN: Primary | ICD-10-CM

## 2019-02-05 DIAGNOSIS — S67.21XA CRUSH INJURY OF HAND, RIGHT, INITIAL ENCOUNTER: ICD-10-CM

## 2019-02-05 PROCEDURE — 97110 THERAPEUTIC EXERCISES: CPT

## 2019-02-05 NOTE — THERAPY TREATMENT NOTE
Outpatient Physical Therapy Ortho Treatment Note  [facility]     Patient Name: Shania Sweeney  : 1965  MRN: [id number]  Today's Date: 2019      Visit Date: 2019     Subjective Improvement 0  Visits 4/5  Visits approved medicare cap  RTMD 2019  Recert Date 2019    Right hand crushing injury      Visit Dx:    ICD-10-CM ICD-9-CM   1. Right hand pain M79.641 729.5   2. 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   • Anxiety   • Neck pain   • Brachial neuritis   • Bronchitis   • Urinary frequency   • Right hand pain        Past Medical History:   Diagnosis Date   • Abnormal Pap smear of cervix    • Acute bronchitis     Improving   • Anxiety    • Asthma    • 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    • Chronic back pain     C spine surgery   • Chronic headache disorder    • Cough    • Degenerative joint disease involving multiple joints    • Depression    • Rossana-Danlos syndrome    • GERD (gastroesophageal reflux disease)    • Hip pain    • Hyperlipidemia     Unspecified   • Incontinence    • Insomnia    • Lumbosacral neuritis    • Multiple joint pain    • Neck pain    • Need for prophylactic vaccination and inoculation against influenza    • Obesity    • Pain in joint involving shoulder region     Left   • Rash and nonspecific skin eruption     Rash and other nonspecific skin eruption      • Spasm of back muscles    • Spasm of muscle         Past Surgical History:   Procedure Laterality Date   • APPENDECTOMY     • BARIATRIC SURGERY     • CERVICAL SPINE SURGERY N/A    • CHOLECYSTECTOMY     • EPIDURAL LEAD INSERTION N/A  10/27/2017    Procedure: INTERSTIM STAGES ONE AND TWO   (C-ARM AND C-ARM TABLE);  Surgeon: Farshad Metzger MD;  Location: [facility];  Service:    • FACIAL RECONSTRUCTION SURGERY N/A    • HYSTERECTOMY  2006    with BSO   • INJECTION OF MEDICATION  09/24/2015    Depo Medrol (Methylprednisone) 80mg    • INJECTION OF MEDICATION  03/19/2015    Toradol   • LUMBAR SPINE SURGERY N/A    • PROCEDURE GENERIC CONVERTED  05/24/2013    Nebulizer Treatment   • RECONSTRUCTION OF NOSE N/A    • SHOULDER ROTATOR CUFF REPAIR Right     x 2   • TONSILLECTOMY     • TRIGGER POINT INJECTION  03/02/2016    [place] Pt Inj, sing/multi pts. 3/+ muscles                        PT Assessment/Plan     Row Name 02/05/19 1445          PT Assessment    Assessment Comments  TTP with scar massage mainly on middle finger.  Patient is compliant with HEP.  Full passive composite fist after therapy.  -CP        PT Plan    PT Frequency  1x/week  -CP     Predicted Duration of Therapy Intervention (Therapy Eval)  4 weeks  -CP     PT Plan Comments  Recheck next week  -CP       User Key  (r) = Recorded By, (t) = Taken By, (c) = Cosigned By    Initials Name Provider Type    CP Romy Brennan, PTA Physical Therapy Assistant              Exercises     Row Name 02/05/19 1400             Subjective Comments    Subjective Comments  Patient states that at times has a shooting pain in the middle finger.  Still unable to make a fist.  Doing ex at home  -CP         Subjective Pain    Able to rate subjective pain?  yes  -CP      Pre-Treatment Pain Level  3  -CP      Post-Treatment Pain Level  3  -CP         Exercise 1    Exercise Name 1  fluidotherapy  -CP      Time 1  15  -CP         Exercise 2    Exercise Name 2  scar massage each finger  -CP      Time 2  15  -CP         Exercise 3    Exercise Name 3  LLPS each finger  -CP      Sets 3  3  -CP      Time 3  1' each  -CP         Exercise 4    Exercise Name 4  LLPS composite fist  -CP      Sets 4  2  -CP      Time 4  1'   -CP         Exercise 5    Exercise Name 5  rice and beans  -CP      Time 5  3  -CP        User Key  (r) = Recorded By, (t) = Taken By, (c) = Cosigned By    Initials Name Provider Type    CP Romy Brennan PTA Physical Therapy Assistant                         PT OP Goals     Row Name 02/05/19 1400          PT Short Term Goals    STG Date to Achieve  02/06/19  -CP     STG 1  Note a >/= 25% subjective improvement.  -CP     STG 1 Progress  Not Met  -CP     STG 2  QuickDASH score to be </= 50.  -CP     STG 2 Progress  Not Met  -CP     STG 3  R  strength to be >/= 25#.  -CP     STG 3 Progress  Progressing  -CP     STG 4  Increase R IF DIP flexion to >/= 60 deg.  -CP     STG 4 Progress  Progressing  -CP        Long Term Goals    LTG Date to Achieve  03/13/19  -CP     LTG 1  Independent with HEP.  -CP     LTG 1 Progress  Ongoing  -CP     LTG 2  QuickDASH score to be </= 25.  -CP     LTG 2 Progress  Not Met  -CP     LTG 3  Demonstrate full digital AROM without pain.  -CP     LTG 3 Progress  Progressing  -CP     LTG 4  R  strength to be >/= 60#.  -CP     LTG 4 Progress  Progressing  -CP     LTG 5  Report ability to tie fishing line.  -CP     LTG 5 Progress  Not Met  -CP        Time Calculation    PT Goal Re-Cert Due Date  02/06/19  -CP       User Key  (r) = Recorded By, (t) = Taken By, (c) = Cosigned By    Initials Name Provider Type    CP Romy Brennan PTA Physical Therapy Assistant                         Time Calculation:   Start Time: 1345  Stop Time: 1435  Time Calculation (min): 50 min  Total Timed Code Minutes- PT: 50 minute(s)  Therapy Suggested Charges     Code   Minutes Charges    None           Therapy Charges for Today     Code Description Service Date Service Provider Modifiers Qty    95694566212 HC PT THER PROC EA 15 MIN 2/5/2019 Romy Brennan, IVETT GP 3                    Romy Brennan PTA  2/5/2019

## 2019-02-12 ENCOUNTER — HOSPITAL ENCOUNTER (OUTPATIENT)
Dept: PHYSICAL THERAPY | Facility: HOSPITAL | Age: 54
Setting detail: THERAPIES SERIES
Discharge: HOME | End: 2019-02-12

## 2019-02-12 ENCOUNTER — OFFICE VISIT (OUTPATIENT)
Dept: ORTHOPEDIC SURGERY | Facility: CLINIC | Age: 54
End: 2019-02-12

## 2019-02-12 VITALS — WEIGHT: 293 LBS | BODY MASS INDEX: 41.02 KG/M2 | HEIGHT: 71 IN

## 2019-02-12 DIAGNOSIS — S67.21XA CRUSH INJURY OF HAND, RIGHT, INITIAL ENCOUNTER: ICD-10-CM

## 2019-02-12 DIAGNOSIS — S67.20XD: Primary | ICD-10-CM

## 2019-02-12 DIAGNOSIS — Q79.60 EHLERS-DANLOS SYNDROME: ICD-10-CM

## 2019-02-12 DIAGNOSIS — M79.641 RIGHT HAND PAIN: ICD-10-CM

## 2019-02-12 DIAGNOSIS — M79.641 RIGHT HAND PAIN: Primary | ICD-10-CM

## 2019-02-12 PROCEDURE — 97110 THERAPEUTIC EXERCISES: CPT | Performed by: PHYSICAL THERAPIST

## 2019-02-12 PROCEDURE — 99213 OFFICE O/P EST LOW 20 MIN: CPT | Performed by: ORTHOPAEDIC SURGERY

## 2019-02-12 NOTE — THERAPY PROGRESS REPORT/RE-CERT
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    • Chronic back pain     C spine surgery   • Chronic headache disorder    • Cough    • Degenerative joint disease involving multiple joints    • Depression    • Rossana-Danlos syndrome    • GERD (gastroesophageal reflux disease)    • Hip pain    • Hyperlipidemia     Unspecified   • Incontinence    • Insomnia    • Lumbosacral neuritis    • Multiple joint pain    • Neck pain    • Need for prophylactic vaccination and inoculation against influenza    • Obesity    • Pain in joint involving shoulder region     Left   • Rash and nonspecific skin eruption     Rash and other nonspecific skin eruption      • Spasm of back muscles    • Spasm of muscle         Past Surgical History:   Procedure Laterality Date   • APPENDECTOMY     • BARIATRIC SURGERY     • CERVICAL SPINE SURGERY N/A    • CHOLECYSTECTOMY     • EPIDURAL LEAD INSERTION N/A 10/27/2017    Procedure: INTERSTIM STAGES ONE AND TWO   (C-ARM AND C-ARM TABLE);  Surgeon: Farshad Metzger MD;  Location: [facility];  Service:    • FACIAL RECONSTRUCTION SURGERY N/A    • HYSTERECTOMY  2006    with BSO   • INJECTION OF MEDICATION  2015    Depo Medrol (Methylprednisone) 80mg    • INJECTION OF MEDICATION  2015    Toradol   • LUMBAR SPINE SURGERY N/A    • PROCEDURE GENERIC CONVERTED  2013    Nebulizer Treatment   • RECONSTRUCTION OF NOSE  N/A    • SHOULDER ROTATOR CUFF REPAIR Right     x 2   • TONSILLECTOMY     • TRIGGER POINT INJECTION  03/02/2016    [place] Pt Inj, sing/multi pts. 3/+ muscles          Visit Dx:    ICD-10-CM ICD-9-CM   1. Right hand pain M79.641 729.5   2. Crush injury of hand, right, initial encounter S67.21XA 927.20              Hand Therapy (last 24 hours)      Hand Eval     Row Name 02/12/19 0800          II- MP AROM  0  -SS    II- PIP AROM  -10  -SS    II- DIP AROM  10  -SS    II- BOOKER Right Extension AROM  0  -SS    III- MP AROM  0  -SS    III- PIP AROM  -15  -SS    III- DIP AROM  10  -SS    III- BOOKER Right Extension AROM  -5  -SS    IV- MP AROM  0  -SS    IV- PIP AROM  5  -SS    IV- DIP AROM  5  -SS    IV- BOOKER Right Extension AROM  10  -SS    V- MP AROM  30  -SS    V- PIP AROM  -10  -SS    V- DIP AROM  5  -SS    V- BOOKER Right Extension AROM  25  -SS          II- MP AROM  75  -SS    II- PIP AROM  100  -SS    II- DIP AROM  50  -SS    II- BOOKER Right Flexion AROM  225  -SS    III- MP AROM  70  -SS    III- PIP AROM  105  -SS    III- DIP AROM  75  -SS    III- BOOKER Right Flexion AROM  250  -SS    IV- MP AROM  65  -SS    IV- PIP AROM  105  -SS    IV- DIP AROM  90  -SS    IV- BOOKER Right Flexion AROM  260  -SS    V- MP AROM  75  -SS    V- PIP AROM  100  -SS    V- DIP AROM  75  -SS    V- BOOKER Right Flexion AROM  250  -SS          Number of Reps  1  -SS    Affected Side  Bilateral  -SS          Lateral  12 lbs  -SS    Tip (2 point)  6 lbs  -SS    Three Jaw Nicolas  10 lbs pain ulnar MF PIP  -SS          Lateral  14 lbs  -SS    Tip (2 point)  10 lbs  -SS    Three Jaw Nicolas  14 lbs  -SS      User Key  (r) = Recorded By, (t) = Taken By, (c) = Cosigned By    Initials Name Provider Type    Fred Evans, PT DPT Physical Therapist        PT Ortho     Row Name 02/12/19 0800       Subjective Comments    Subjective Comments  Thinks that R hand is getting stronger and may be closer to making a fist. Pain making a fist, trying to use a knife,  "having fingers forced sideways, full extending hand. Heat helps her hand feel better. 40% subjective improvement. No medication changes.  -       Precautions and Contraindications    Precautions  Rossana-Danlos Type 3, cervical and lumbar fusions  -       Subjective Pain    Able to rate subjective pain?  yes  -SS    Pre-Treatment Pain Level  0  -SS    Post-Treatment Pain Level  0  -SS       Posture/Observations    Posture/Observations Comments  Scars are significantly less noticeable compared to evaluation. Scars are tender to palpation.   -SS       Right Upper Ext    Rt Wrist Flexion AROM  75  -SS    Rt Wrist Extension AROM  72; pain in the thumb  -SS    Rt  Ulnar Deviation AROM  40; \"a tad\" pain ulnar hand  -SS    Rt  Radial Deviation AROM  22  -SS        Strength Right    # Reps  1  -SS    Right Rung  2  -SS    Right  Test 1  25  -SS     Strength Average Right  25  -SS        Strength Left    # Reps  1  -SS    Left Rung  2  -SS    Left  Test 1  55  -SS     Strength Average Left  55  -SS       Hand  Strength     Strength Affected Side  Bilateral  -SS      User Key  (r) = Recorded By, (t) = Taken By, (c) = Cosigned By    Initials Name Provider Type    Fred Evans, PT DPT Physical Therapist                    Therapy Education  Education Details: progress, vibration desensitization  Given: HEP, Other (comment)  Program: Progressed  How Provided: Verbal, Demonstration  Provided to: Patient  Level of Understanding: Verbalized, Demonstrated    PT OP Goals     Row Name 02/12/19 0800          STG Date to Achieve  02/06/19  -    STG 1  Note a >/= 25% subjective improvement.  -    STG 1 Progress  Met  -    STG 2  QuickDASH score to be </= 50.  -    STG 2 Progress  Met  -    STG 3  R  strength to be >/= 25#.  -    STG 3 Progress  Met  -    STG 4  Increase R IF DIP flexion to >/= 60 deg.  -    STG 4 Progress  Met  -          LTG Date to Achieve  03/13/19  " -    LTG 1  Independent with HEP.  -    LTG 1 Progress  Ongoing  -    LTG 2  QuickDASH score to be </= 25.  -    LTG 2 Progress  Not Met  -    LTG 3  Demonstrate full digital AROM without pain.  -SS    LTG 3 Progress  Progressing  -SS    LTG 4  R  strength to be >/= 60#.  -    LTG 4 Progress  Progressing  -    LTG 5  Report ability to tie fishing line.  -    LTG 5 Progress  Not Met  -          PT Goal Re-Cert Due Date  03/05/19  -      User Key  (r) = Recorded By, (t) = Taken By, (c) = Cosigned By    Initials Name Provider Type     Fred Cormier, PT DPT Physical Therapist          PT Assessment/Plan     Row Name 02/12/19 0800          Functional Limitations  Limitation in home management;Limitations in community activities;Limitations in functional capacity and performance;Performance in leisure activities;Performance in self-care ADL  -    Impairments  Pain;Range of motion;Muscle strength;Dexterity  -    Assessment Comments  Improved  and overall function of hand. Scars continue to be sensitive. Rehabilitation Progress Note sent with patient for her appointment with Dr. Kiser this afternoon. Patient was dispensed a mini vibrator for desensitization at home.  -    Rehab Potential  Good barrier: delay from injury to initiation of P.T., Rossana-Gabe  -    Patient/caregiver participated in establishment of treatment plan and goals  Yes  -    Patient would benefit from skilled therapy intervention  Yes  -SS          PT Frequency  1x/week  -    Predicted Duration of Therapy Intervention (Therapy Eval)  3-4 weeks  -    PT Plan Comments  Fluidotherapy, vibration desensitization, tendon gliding, scar massage, desensitization  -      User Key  (r) = Recorded By, (t) = Taken By, (c) = Cosigned By    Initials Name Provider Type     Fred Cormier, PT DPT Physical Therapist            Exercises     Row Name 02/12/19 0800          Subjective Comments  Thinks that R  hand is getting stronger and may be closer to making a fist. Pain making a fist, trying to use a knife, having fingers forced sideways, full extending hand. Heat helps her hand feel better. 40% subjective improvement. No medication changes.  -SS          Able to rate subjective pain?  yes  -SS    Pre-Treatment Pain Level  0  -SS    Post-Treatment Pain Level  0  -SS          Exercise Name 1  Fluidotherapy with AROM  -SS    Cueing 1  Verbal  -SS    Time 1  15 mins  -SS          Exercise Name 2  Straight<->Tabletop<->Fist  -SS    Cueing 2  Verbal;Demo  -SS    Sets 2  1  -SS    Reps 2  10  -SS          Exercise Name 3  Claw <->Fist  -SS    Cueing 3  Verbal;Demo  -SS    Sets 3  1  -SS    Reps 3  10  -SS          Exercise Name 4  LLPS composite flexion with scar massage dorsal proximal phalanx  -SS    Cueing 4  Verbal;Tactile  -SS    Time 4  2 mins  -SS          Exercise Name 5  Vibration desensitization R hand scars  -SS    Cueing 5  Verbal  -SS    Time 5  4 mins  -SS      User Key  (r) = Recorded By, (t) = Taken By, (c) = Cosigned By    Initials Name Provider Type    Fred Evans, PT DPT Physical Therapist                       Outcome Measure Options: Quick DASH  Quick DASH  Open a tight or new jar.: Severe Difficulty  Do heavy household chores (e.g., wash walls, wash floors): Mild Difficulty  Carry a shopping bag or briefcase: Moderate Difficulty  Wash your back: No Difficulty  Use a knife to cut food: Moderate Difficulty  Recreational activities in which you take some force or impact through your arm, should or hand (e.g. golf, hammering, tennis, etc.): Severe Difficulty  During the past week, to what extent has your arm, shoulder, or hand problem interfered with your normal social activities with family, friends, neighbors or groups?: Moderately  During the past week, were you limited in your work or other regular daily activities as a result of your arm, shoulder or hand problem?: Slightly  Limited  Arm, Shoulder, or hand pain: Moderate  Tingling (pins and needles) in your arm, shoulder, or hand: Mild  During the past week, how much difficulty have you had sleeping because of the pain in your arm, shoulder or hand?: No difficulty  Number of Questions Answered: 11  Quick DASH Score: 38.64         Time Calculation:   Start Time: 0844  Stop Time: 0929  Time Calculation (min): 45 min  Total Timed Code Minutes- PT: 45 minute(s)     Therapy Charges for Today     Code Description Service Date Service Provider Modifiers Qty    83745021531 HC PT THER PROC EA 15 MIN 2/12/2019 Fred Cormier, PT DPT GP 3                   Fred Cormier, PT, DPT, CHT  2/12/2019

## 2019-02-12 NOTE — PROGRESS NOTES
"Shania Sweeney is a 53 y.o. female returns for     Chief Complaint   Patient presents with   • Right Hand - Pain, Follow-up       HISTORY OF PRESENT ILLNESS:  Follow up right hand.  [facility]  Patient reports improvement.  Pain level 0/10 today.     CONCURRENT MEDICAL HISTORY:    The following portions of the patient's history were reviewed and updated as appropriate: allergies, current medications, past family history, past medical history, past social history, past surgical history and problem list.     ROS  No fevers or chills.  No chest pain or shortness of air.  No GI or  disturbances.    PHYSICAL EXAMINATION:       Ht 180.3 cm (71\")   Wt 134 kg (295 lb 8 oz)   BMI 41.21 kg/m²     Physical Exam   Constitutional: She is oriented to person, place, and time. She appears well-developed and well-nourished.   Neurological: She is alert and oriented to person, place, and time.   Psychiatric: She has a normal mood and affect. Her behavior is normal. Judgment and thought content normal.       GAIT:     [x]  Normal  []  Antalgic    Assistive device: [x]  None  []  Walker     []  Crutches  []  Cane     []  Wheelchair  []  Stretcher    Right Hand Exam     Comments:  Good claw formation except in index finger.  Nearly full fist  Skin still shiny but improved.  Mild swelling   Good cap refill  Strength improving              Xr Hand 3+ View Right    Result Date: 1/15/2019  Narrative: Ordering Provider:  Farshad Kiser MD Ordering Diagnosis/Indication:  Right hand pain Procedure:  XR HAND 3+ VW RIGHT Exam Date:  1/15/19 COMPARISON:  Not applicable, no relevant images available.     Impression:  3 views of the right hand show acceptable position and alignment with no evidence of acute bony abnormality.  No fracture or dislocation is noted. Farshad Kiser MD 1/15/19             ASSESSMENT:    Diagnoses and all orders for this visit:    Crushing injury of hand, subsequent encounter    Right " hand pain    Rossana-Danlos syndrome          PLAN    Continue with PT and HEP  Continue activity as tolerated  No restrictions - continue to work through restrictions  Recheck in 6 weeks.    Patient's Body mass index is 41.21 kg/m². BMI is above normal parameters. Recommendations include: exercise counseling and nutrition counseling.    Return in about 6 weeks (around 3/26/2019) for recheck.    Farshad Kiser MD

## 2019-02-18 ENCOUNTER — APPOINTMENT (OUTPATIENT)
Dept: PHYSICAL THERAPY | Facility: HOSPITAL | Age: 54
End: 2019-02-18

## 2019-02-21 ENCOUNTER — HOSPITAL ENCOUNTER (OUTPATIENT)
Dept: PHYSICAL THERAPY | Facility: HOSPITAL | Age: 54
Setting detail: THERAPIES SERIES
Discharge: HOME | End: 2019-02-21

## 2019-02-21 DIAGNOSIS — M79.641 RIGHT HAND PAIN: Primary | ICD-10-CM

## 2019-02-21 DIAGNOSIS — S67.21XA CRUSH INJURY OF HAND, RIGHT, INITIAL ENCOUNTER: ICD-10-CM

## 2019-02-21 PROCEDURE — 97110 THERAPEUTIC EXERCISES: CPT

## 2019-02-21 NOTE — THERAPY TREATMENT NOTE
Outpatient Physical Therapy Ortho Treatment Note  [facility]     Patient Name: Shania Sweeney  : 1965  MRN: [id number]  Today's Date: 2019      Visit Date: 2019     Subjective Improvement 40%  Visits 6/8  Visits approved Medicare cap  RTMD 4 week  Recert Date 3-    Crushing injury Right hand    Visit Dx:    ICD-10-CM ICD-9-CM   1. Right hand pain M79.641 729.5   2. 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   • Anxiety   • Neck pain   • Brachial neuritis   • Bronchitis   • Urinary frequency   • Right hand pain        Past Medical History:   Diagnosis Date   • Abnormal Pap smear of cervix    • Acute bronchitis     Improving   • Anxiety    • Asthma    • 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    • Chronic back pain     C spine surgery   • Chronic headache disorder    • Cough    • Degenerative joint disease involving multiple joints    • Depression    • Rossana-Danlos syndrome    • GERD (gastroesophageal reflux disease)    • Hip pain    • Hyperlipidemia     Unspecified   • Incontinence    • Insomnia    • Lumbosacral neuritis    • Multiple joint pain    • Neck pain    • Need for prophylactic vaccination and inoculation against influenza    • Obesity    • Pain in joint involving shoulder region     Left   • Rash and nonspecific skin eruption     Rash and other nonspecific skin eruption      • Spasm of back muscles    • Spasm of muscle         Past Surgical History:   Procedure Laterality Date   • APPENDECTOMY     • BARIATRIC SURGERY     • CERVICAL SPINE SURGERY N/A    • CHOLECYSTECTOMY     • EPIDURAL LEAD INSERTION N/A 10/27/2017  "   Procedure: INTERSTIM STAGES ONE AND TWO   (C-ARM AND C-ARM TABLE);  Surgeon: Farshad Metzger MD;  Location: [facility] OR;  Service:    • FACIAL RECONSTRUCTION SURGERY N/A    • HYSTERECTOMY  2006    with BSO   • INJECTION OF MEDICATION  09/24/2015    Depo Medrol (Methylprednisone) 80mg    • INJECTION OF MEDICATION  03/19/2015    Toradol   • LUMBAR SPINE SURGERY N/A    • PROCEDURE GENERIC CONVERTED  05/24/2013    Nebulizer Treatment   • RECONSTRUCTION OF NOSE N/A    • SHOULDER ROTATOR CUFF REPAIR Right     x 2   • TONSILLECTOMY     • TRIGGER POINT INJECTION  03/02/2016    [place] Pt Inj, sing/multi pts. 3/+ muscles                        PT Assessment/Plan     Row Name 02/21/19 1507          PT Assessment    Assessment Comments  Patients scar appeared softer.  Full complostie fist after paraffin and LLPS.  -CP        PT Plan    PT Frequency  1x/week  -CP     Predicted Duration of Therapy Intervention (Therapy Eval)  3-4 weeks  -CP     PT Plan Comments  Cont with POC.  Attempt box carry  -CP       User Key  (r) = Recorded By, (t) = Taken By, (c) = Cosigned By    Initials Name Provider Type    CP Romy Brennan, PTA Physical Therapy Assistant              Exercises     Row Name 02/21/19 1300             Subjective Comments    Subjective Comments  States that yesterday and this morning, she had an increase sharp pain from hand to elbow..  At present, no pain  -CP         Subjective Pain    Able to rate subjective pain?  yes  -CP      Pre-Treatment Pain Level  0  -CP         Exercise 1    Exercise Name 1  Paraffin  -CP      Time 1  10  -CP         Exercise 2    Exercise Name 2  scar massage to ez  -CP         Exercise 3    Exercise Name 3  LLPS composite stretch in digit  -CP      Reps 3  2  -CP      Time 3  1\"  -CP      Additional Comments   each  -CP         Exercise 4    Exercise Name 4  straight - claw- tabletop- claw straight  -CP      Sets 4  1  -CP      Reps 4  10  -CP         Exercise 5    Exercise Name 5  " vibration  -CP      Time 5  2  -CP         Exercise 6    Exercise Name 6  box carry box only  -CP      Reps 6  200 ft.  -CP      Additional Comments  unable to hold box for entire time  -CP         Exercise 7    Exercise Name 7  fishing hook tie  -CP      Time 7  --  -CP         Exercise 8    Exercise Name 8  paraffin  -CP      Time 8  10  -CP        User Key  (r) = Recorded By, (t) = Taken By, (c) = Cosigned By    Initials Name Provider Type    Romy Arellano, IVETT Physical Therapy Assistant                         PT OP Goals     Row Name 02/21/19 1500          PT Short Term Goals    STG Date to Achieve  02/06/19  -CP     STG 1  Note a >/= 25% subjective improvement.  -CP     STG 1 Progress  Met  -CP     STG 2  QuickDASH score to be </= 50.  -CP     STG 2 Progress  Met  -CP     STG 3  R  strength to be >/= 25#.  -CP     STG 3 Progress  Met  -CP     STG 4  Increase R IF DIP flexion to >/= 60 deg.  -CP     STG 4 Progress  Met  -CP        Long Term Goals    LTG Date to Achieve  03/13/19  -CP     LTG 1  Independent with HEP.  -CP     LTG 1 Progress  Ongoing  -CP     LTG 2  QuickDASH score to be </= 25.  -CP     LTG 2 Progress  Not Met  -CP     LTG 3  Demonstrate full digital AROM without pain.  -CP     LTG 3 Progress  Progressing  -CP     LTG 4  R  strength to be >/= 60#.  -CP     LTG 4 Progress  Progressing  -CP     LTG 5  Report ability to tie fishing line.  -CP     LTG 5 Progress  Not Met  -CP        Time Calculation    PT Goal Re-Cert Due Date  03/05/19  -CP       User Key  (r) = Recorded By, (t) = Taken By, (c) = Cosigned By    Initials Name Provider Type    Romy Arellano PTA Physical Therapy Assistant          Therapy Education  Education Details: work on fishing ties.  Given: HEP  Program: New  How Provided: Verbal, Demonstration  Provided to: Patient  Level of Understanding: Verbalized, Demonstrated              Time Calculation:   Start Time: 1300  Stop Time: 1350  Time Calculation (min):  50 min  Total Timed Code Minutes- PT: 50 minute(s)  Therapy Suggested Charges     Code   Minutes Charges    None           Therapy Charges for Today     Code Description Service Date Service Provider Modifiers Qty    91362790860 HC PT THER PROC EA 15 MIN 2/21/2019 Romy Brennan, PTA GP 3                    Romy Brennan, IVETT  2/21/2019

## 2019-02-28 ENCOUNTER — APPOINTMENT (OUTPATIENT)
Dept: PHYSICAL THERAPY | Facility: HOSPITAL | Age: 54
End: 2019-02-28

## 2019-03-06 ENCOUNTER — HOSPITAL ENCOUNTER (OUTPATIENT)
Dept: PHYSICAL THERAPY | Facility: HOSPITAL | Age: 54
Setting detail: THERAPIES SERIES
Discharge: HOME | End: 2019-03-06

## 2019-03-06 DIAGNOSIS — M79.641 RIGHT HAND PAIN: Primary | ICD-10-CM

## 2019-03-06 DIAGNOSIS — S67.21XA CRUSH INJURY OF HAND, RIGHT, INITIAL ENCOUNTER: ICD-10-CM

## 2019-03-06 PROCEDURE — 97110 THERAPEUTIC EXERCISES: CPT | Performed by: PHYSICAL THERAPIST

## 2019-03-06 PROCEDURE — 97035 APP MDLTY 1+ULTRASOUND EA 15: CPT | Performed by: PHYSICAL THERAPIST

## 2019-03-07 NOTE — THERAPY PROGRESS REPORT/RE-CERT
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    • Chronic back pain     C spine surgery   • Chronic headache disorder    • Cough    • Degenerative joint disease involving multiple joints    • Depression    • Rossana-Danlos syndrome    • GERD (gastroesophageal reflux disease)    • Hip pain    • Hyperlipidemia     Unspecified   • Incontinence    • Insomnia    • Lumbosacral neuritis    • Multiple joint pain    • Neck pain    • Need for prophylactic vaccination and inoculation against influenza    • Obesity    • Pain in joint involving shoulder region     Left   • Rash and nonspecific skin eruption     Rash and other nonspecific skin eruption      • Spasm of back muscles    • Spasm of muscle         Past Surgical History:   Procedure Laterality Date   • APPENDECTOMY     • BARIATRIC SURGERY     • CERVICAL SPINE SURGERY N/A    • CHOLECYSTECTOMY     • EPIDURAL LEAD INSERTION N/A 10/27/2017    Procedure: INTERSTIM STAGES ONE AND TWO   (C-ARM AND C-ARM TABLE);  Surgeon: Farshad Metzger MD;  Location: [facility];  Service:    • FACIAL RECONSTRUCTION SURGERY N/A    • HYSTERECTOMY  2006    with BSO   • INJECTION OF MEDICATION  2015    Depo Medrol (Methylprednisone) 80mg    • INJECTION OF MEDICATION  2015    Toradol   • LUMBAR SPINE SURGERY N/A    • PROCEDURE GENERIC CONVERTED  2013    Nebulizer Treatment   • RECONSTRUCTION OF NOSE  "N/A    • SHOULDER ROTATOR CUFF REPAIR Right     x 2   • TONSILLECTOMY     • TRIGGER POINT INJECTION  03/02/2016    [place] Pt Inj, sing/multi pts. 3/+ muscles          Visit Dx:    ICD-10-CM ICD-9-CM   1. Right hand pain M79.641 729.5   2. Crush injury of hand, right, initial encounter S67.21XA 927.20              Hand Therapy (last 24 hours)      Hand Eval     Row Name 03/06/19 1300          II- PIP AROM  -10 pain  -SS    III- PIP AROM  -10 pain  -SS          III- PIP PROM  -10  -SS          # Reps  1  -SS    Right Rung  2  -SS    Right  Test 1  42  -SS     Strength Average Right  42  -SS          # Reps  1  -SS    Left Rung  2  -SS    Left  Test 1  75  -SS     Strength Average Left  75  -SS          Lateral  14 lbs mild pain \"nothing bad\"  -SS    Tip (2 point)  8 lbs  -SS    Three Jaw Nicolas  13 lbs \"just a little bit\" of pain dorsal MF PIP  -SS      User Key  (r) = Recorded By, (t) = Taken By, (c) = Cosigned By    Initials Name Provider Type    SS Fred Cormier, PT DPT Physical Therapist        PT Ortho     Row Name 03/06/19 1300       Subjective Comments    Subjective Comments  80% subjective improvement. Hand is stronger, has more mobility. \"I can do about everything better.\" Was able to tie a fishing lure last week. Missed last week due to LBP. Unable to wear her rings on her R hand yet. \"I've adapted, I think.\" Shook hands with right hand this past Sunday. Still painful when fingers are pushed either radially or ulnarly.   -SS       Precautions and Contraindications    Precautions  Rossana-Danlos Type 3, cervical and lumbar fusions  -SS       Subjective Pain    Able to rate subjective pain?  yes  -SS    Pre-Treatment Pain Level  0  -SS    Post-Treatment Pain Level  0  -SS       Posture/Observations    Posture/Observations Comments  Scar on MF is tender to palpation. Patient has tenderness in the middle and ring finger proximal phalanges as well MF PIP circumferentially.   -SS       Right " Fingers    RT FINGERS COMMENTS  Digital flexion WNLs.  -      User Key  (r) = Recorded By, (t) = Taken By, (c) = Cosigned By    Initials Name Provider Type    Fred Evans, PT DPT Physical Therapist                    Therapy Education  Education Details: US   Given: Other (comment)  How Provided: Verbal  Provided to: Patient  Level of Understanding: Verbalized    PT OP Goals     Row Name 03/06/19 1300          STG Date to Achieve  -- further deferred  -    STG 1  Note a >/= 25% subjective improvement.  -    STG 1 Progress  Met  -    STG 2  QuickDASH score to be </= 50.  -    STG 2 Progress  Met  -    STG 3  R  strength to be >/= 25#.  -    STG 3 Progress  Met  -    STG 4  Increase R IF DIP flexion to >/= 60 deg.  -    STG 4 Progress  Met  -          LTG Date to Achieve  03/27/19  -    LTG 1  Independent with HEP.  -    LTG 1 Progress  Ongoing  -    LTG 2  QuickDASH score to be </= 25.  -    LTG 2 Progress  Met  -    LTG 3  Demonstrate full digital AROM without pain.  -    LTG 3 Progress  Not Met;Ongoing  -    LTG 4  R  strength to be >/= 60#.  -    LTG 4 Progress  Not Met;Ongoing  -    LTG 5  Report ability to tie fishing line.  -    LTG 5 Progress  Met  -          PT Goal Re-Cert Due Date  03/27/19  -      User Key  (r) = Recorded By, (t) = Taken By, (c) = Cosigned By    Initials Name Provider Type    Fred Evans, PT DPT Physical Therapist          PT Assessment/Plan     Row Name 03/06/19 1300          Functional Limitations  Limitation in home management;Limitations in community activities;Limitations in functional capacity and performance;Performance in leisure activities;Performance in self-care ADL  -    Impairments  Pain;Range of motion;Muscle strength;Dexterity  -    Assessment Comments  Tolerated US well. Functionally using had better, but continues to have pain. Attempting US for scar remodeling.  -    Rehab Potential   "Good barrier: delay in initiating PT, Rossana-Danlos  -SS    Patient/caregiver participated in establishment of treatment plan and goals  Yes  -SS    Patient would benefit from skilled therapy intervention  Yes  -SS          PT Frequency  1x/week  -SS    Predicted Duration of Therapy Intervention (Therapy Eval)  2-3 weeks  -SS    PT Plan Comments  US palm and volar MF & RF, fluidotherapy, scar care/desensitization.  -SS      User Key  (r) = Recorded By, (t) = Taken By, (c) = Cosigned By    Initials Name Provider Type    Fred Evans, PT DPT Physical Therapist          Modalities     Row Name 03/06/19 1300             Ultrasound [id number]    Location  R palm and volar MF  -SS      Duty Cycle  100  -SS      Frequency  3.0 MHz  -SS      Intensity - Wts/cm  1  -SS        User Key  (r) = Recorded By, (t) = Taken By, (c) = Cosigned By    Initials Name Provider Type    Fred Evans, PT DPT Physical Therapist        Exercises     Row Name 03/06/19 1300          Subjective Comments  80% subjective improvement. Hand is stronger, has more mobility. \"I can do about everything better.\" Was able to tie a fishing lure last week. Missed last week due to LBP. Unable to wear her rings on her R hand yet. \"I've adapted, I think.\" Shook hands with right hand this past Sunday. Still painful when fingers are pushed either radially or ulnarly.   -SS          Able to rate subjective pain?  yes  -SS    Pre-Treatment Pain Level  0  -SS    Post-Treatment Pain Level  0  -SS          Exercise Name 1  Fluidotherapy with AROM  -SS    Cueing 1  Verbal  -SS    Time 1  15 mins  -SS          Exercise Name 2  reassess  -SS      User Key  (r) = Recorded By, (t) = Taken By, (c) = Cosigned By    Initials Name Provider Type    Fred Evans, PT DPT Physical Therapist                       Outcome Measure Options: Quick DASH  Quick DASH  Open a tight or new jar.: Moderate Difficulty  Do heavy household chores (e.g., wash " walls, wash floors): Mild Difficulty  Carry a shopping bag or briefcase: Mild Difficulty  Wash your back: No Difficulty  Use a knife to cut food: Mild Difficulty  Recreational activities in which you take some force or impact through your arm, should or hand (e.g. golf, hammering, tennis, etc.): Severe Difficulty  During the past week, to what extent has your arm, shoulder, or hand problem interfered with your normal social activities with family, friends, neighbors or groups?: Slightly  During the past week, were you limited in your work or other regular daily activities as a result of your arm, shoulder or hand problem?: Slightly Limited  Arm, Shoulder, or hand pain: None  Tingling (pins and needles) in your arm, shoulder, or hand: Mild  During the past week, how much difficulty have you had sleeping because of the pain in your arm, shoulder or hand?: No difficulty  Number of Questions Answered: 11  Quick DASH Score: 25         Time Calculation:   Start Time: 1303  Stop Time: 1346  Time Calculation (min): 43 min  Total Timed Code Minutes- PT: 43 minute(s)     Therapy Charges for Today     Code Description Service Date Service Provider Modifiers Qty    31047500963  PT THER PROC EA 15 MIN 3/6/2019 Fred Cormier, PT DPT GP 2    52544809135 HC PT ULTRASOUND EA 15 MIN 3/6/2019 Fred Cormier, PT DPT GP 1                   Fred Cormier, PT, DPT, CHT  3/6/2019

## 2019-03-13 ENCOUNTER — APPOINTMENT (OUTPATIENT)
Dept: PHYSICAL THERAPY | Facility: HOSPITAL | Age: 54
End: 2019-03-13

## 2019-03-14 ENCOUNTER — APPOINTMENT (OUTPATIENT)
Dept: PHYSICAL THERAPY | Facility: HOSPITAL | Age: 54
End: 2019-03-14

## 2019-03-20 ENCOUNTER — APPOINTMENT (OUTPATIENT)
Dept: PHYSICAL THERAPY | Facility: HOSPITAL | Age: 54
End: 2019-03-20

## 2022-12-03 NOTE — THERAPY TREATMENT NOTE
Outpatient Physical Therapy Ortho Treatment Note  [facility]     Patient Name: Shania Sweeney  : 1965  MRN: [id number]  Today's Date: 2017      Visit Date: 2017     Subjective Improvement: 25%  Attendance:   (med nec)  Next MD Visit : end   Recert Date:  17      Therapy Diagnosis:  Left 5th MT fx; ankle sprain        Visit Dx:    ICD-10-CM ICD-9-CM   1. 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   • Anxiety   • Neck pain   • Brachial neuritis   • Bronchitis        Past Medical History:   Diagnosis Date   • Acute bronchitis     Improving   • Anxiety    • 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    • Chronic back pain     C spine surgery   • Chronic headache disorder    • Cough    • Degenerative joint disease involving multiple joints    • Rossana-Danlos syndrome    • Hip pain    • Hyperlipidemia     Unspecified   • Insomnia    • Lumbosacral neuritis    • Multiple joint pain    • Neck pain    • Need for prophylactic vaccination and inoculation against influenza    • Obesity    • Pain in joint involving shoulder region     Left   • Rash and nonspecific skin eruption     Rash and other nonspecific skin eruption      • Spasm of back muscles    • Spasm of muscle         Past Surgical History:   Procedure Laterality Date   • APPENDECTOMY     • CERVICAL SPINE SURGERY N/A    • CHOLECYSTECTOMY     • FACIAL RECONSTRUCTION SURGERY N/A    • HYSTERECTOMY     • INJECTION OF MEDICATION  2015    Depo Medrol (Methylprednisone) 80mg    • INJECTION OF MEDICATION  2015     Toradol   • LUMBAR SPINE SURGERY N/A    • PROCEDURE GENERIC CONVERTED  05/24/2013    Nebulizer Treatment   • RECONSTRUCTION OF NOSE N/A    • SHOULDER ROTATOR CUFF REPAIR Right     x 2   • TONSILLECTOMY     • TRIGGER POINT INJECTION  03/02/2016    [place] Pt Inj, sing/multi pts. 3/+ muscles              PT Ortho       06/01/17 1345    Precautions and Contraindications    Precautions/Limitations no known precautions/limitations  -    Posture/Observations    Posture/Observations Comments antalgic gait w/ SPC; ASO L ankle.   -      05/30/17 1338    Precautions and Contraindications    Precautions/Limitations no known precautions/limitations  -    Posture/Observations    Posture/Observations Comments gait w/ SPC; min antalgia; no ASO this date.   -      User Key  (r) = Recorded By, (t) = Taken By, (c) = Cosigned By    Initials Name Provider Type    BRANDO Almonte PTA Physical Therapy Assistant                            PT Assessment/Plan       06/01/17 1345       PT Assessment    Assessment Comments pain in the ankle and knee with step ups this date. No new goals met at this time.  Repsonded well to manual treatment.   -     PT Plan    PT Frequency 3x/week  -     Predicted Duration of Therapy Intervention (days/wks) 4-6 weeks  -     PT Plan Comments SLS next; Possible US to left ankle if continues to see no improvement. Recheck with PT next week. Continue with ROM and strength and balance of the the left ankle as able.   -       User Key  (r) = Recorded By, (t) = Taken By, (c) = Cosigned By    Initials Name Provider Type    BRANDO Almonte PTA Physical Therapy Assistant                Modalities       06/01/17 1345          Subjective Pain    Post-Treatment Pain Level 3  -      Ice    Ice Applied Yes  -      Location Left ankle w/ IFC 20min  -      Rx Minutes Other:  -      Ice S/P Rx Yes  -      ELECTRICAL STIMULATION    Attended/Unattended Unattended  -      Stimulation Type IFC  -       "Location/Electrode Placement/Other Left ankle w/ Ice  -KH      Rx Minutes 20 mins  -KH        User Key  (r) = Recorded By, (t) = Taken By, (c) = Cosigned By    Initials Name Provider Type    BRANDO Almonte PTA Physical Therapy Assistant                Exercises       06/01/17 1345          Subjective Comments    Subjective Comments Pt reports that her knee and ankle have been hurting more the last couple of days.   -KH      Subjective Pain    Able to rate subjective pain? yes  -KH      Pre-Treatment Pain Level 5  -KH      Post-Treatment Pain Level 3  -KH      Exercise 1    Exercise Name 1 pro 2 ROM/strength  -KH      Time (Minutes) 1 8'  -KH      Exercise 2    Exercise Name 2 HC stretch w/ strap  -KH      Sets 2 3  -KH      Time (Seconds) 2 30\"  -KH      Exercise 3    Exercise Name 3 Tband ankle 4 way  -KH      Resistance 3 Red  -KH      Sets 3 3  -KH      Reps 3 10  -KH      Exercise 4    Exercise Name 4 Wobble board standing cw/ccw  -KH      Sets 4 2  -KH      Reps 4 10  -KH      Exercise 5    Exercise Name 5 step up and over  -KH      Sets 5 2  -KH      Reps 5 10  -KH        User Key  (r) = Recorded By, (t) = Taken By, (c) = Cosigned By    Initials Name Provider Type    BRANDO Almonte PTA Physical Therapy Assistant                        Manual Rx (last 36 hours)      Manual Treatments       06/01/17 1345          Manual Rx 1    Manual Rx 1 Location L ankle   -KH      Manual Rx 1 Type posterior glide/MT glides/ manual stretch  -KH      Manual Rx 1 Duration 5'  -KH        User Key  (r) = Recorded By, (t) = Taken By, (c) = Cosigned By    Initials Name Provider Type    BRANDO Almonte PTA Physical Therapy Assistant                PT OP Goals       06/01/17 1345       PT Short Term Goals    STG Date to Achieve 06/01/17  -KH     STG 1 Tolerate a 45 minute treatment session with no increase in pain.  -KH     STG 1 Progress Progressing  -KH     STG 2 L ankle DF to 5 or greater.  -     STG 2 Progress Progressing  -KH  "    STG 3 Increase L eversion strength to 4-/5 or greater.  -     STG 3 Progress Ongoing  -     STG 4 L SLS EO 30 seconds or greater.  -KH     STG 4 Progress Ongoing  -KH     Long Term Goals    LTG Date to Achieve 06/01/17  -KH     LTG 1 60% improvement or greater.  -KH     LTG 1 Progress Progressing  -KH     LTG 2 Ambulate with non-antalgic gait.  -KH     LTG 2 Progress Progressing  -KH     LTG 3 Decrease use of ankle brace to 3 hours per day or less.  -KH     LTG 3 Progress Ongoing  -KH     LTG 4 LEFS to 16 or less.  -KH     LTG 4 Progress Ongoing  -KH     LTG 5 L SLS EO 1 minute or greater.  -KH     LTG 5 Progress Progressing  -KH     LTG 6 L ankle DF to 12 or greater.  -KH     LTG 6 Progress Progressing  -KH     LTG 7 L ankle PF to 50 or greater.  -KH     LTG 7 Progress Progressing  -KH     LTG 8 Increase L eversion strength to 4+/5 or greater.  -KH     LTG 8 Progress Progressing  -KH     Time Calculation    PT Goal Re-Cert Due Date 06/01/17  -       User Key  (r) = Recorded By, (t) = Taken By, (c) = Cosigned By    Initials Name Provider Type    BRANDO Almnote PTA Physical Therapy Assistant                    Time Calculation:   Start Time: 1345  Stop Time: 1448  Time Calculation (min): 63 min  Total Timed Code Minutes- PT: 43 minute(s)    Therapy Charges for Today     Code Description Service Date Service Provider Modifiers Qty    08160378844 HC PT THER SUPP EA 15 MIN 6/1/2017 Rosalind Almonte PTA GP 1    88767296399 HC PT ELECTRICAL STIM UNATTENDED 6/1/2017 Rosalind Almonte PTA  1    46263529513 HC PT THER PROC EA 15 MIN 6/1/2017 Rosalind Almonte PTA GP 3                    Rosalind Almonte PTA  6/1/2017      foot pain

## (undated) DEVICE — CVR SURG EQUIP BND RECTG 36X28

## (undated) DEVICE — 3M(TM) STERI-STRIP(TM) BLEND TONE SKIN CLOSURES (NON-REINFORCED) B1553: Brand: 3M™ STERI-STRIP™

## (undated) DEVICE — 1010 S-DRAPE TOWEL DRAPE 10/BX: Brand: STERI-DRAPE™

## (undated) DEVICE — GLV SURG NEOLON 2G PF LF 6.5 STRL

## (undated) DEVICE — INCISE SURGICAL DRAPE: Brand: OPSITE INCISE 28X30CM CTN 10

## (undated) DEVICE — KT INTRO LD INTERSTIM 2 355018

## (undated) DEVICE — APPL CHLORAPREP W/TINT 26ML ORNG

## (undated) DEVICE — SPNG GZ STRL 2S 4X4 12PLY

## (undated) DEVICE — GLV SURG NEOLON 2G PF LF 7.5 STRL

## (undated) DEVICE — SHEET,DRAPE,53X77,STERILE: Brand: MEDLINE

## (undated) DEVICE — ANTENNA PROGRAMMER NEUROSTIM EXT 37092SFR

## (undated) DEVICE — ADHS SKIN MASTISOL CAP 2OZ DISP

## (undated) DEVICE — INTENDED FOR TISSUE SEPARATION, AND OTHER PROCEDURES THAT REQUIRE A SHARP SURGICAL BLADE TO PUNCTURE OR CUT.: Brand: BARD-PARKER ® CARBON RIB-BACK BLADES

## (undated) DEVICE — NDL FORAMAN 20G 5IN

## (undated) DEVICE — CAMERA COVER: Brand: UNBRANDED

## (undated) DEVICE — SUT VIC 2/0 SH 27IN

## (undated) DEVICE — SKIN AFFIX SURG ADHESIVE 72/CS 0.55ML: Brand: MEDLINE

## (undated) DEVICE — SUT VIC 3/0 SH 27IN J416H

## (undated) DEVICE — CABL SACRAL NEUROSTM INTERSTIM 218CM

## (undated) DEVICE — SOL IRR H2O BTL 1000ML STRL

## (undated) DEVICE — PK MAJ PROC LF 60

## (undated) DEVICE — GLV SURG SENSICARE GREEN W/ALOE PF LF 7 STRL